# Patient Record
Sex: MALE | Race: WHITE | NOT HISPANIC OR LATINO | Employment: OTHER | URBAN - METROPOLITAN AREA
[De-identification: names, ages, dates, MRNs, and addresses within clinical notes are randomized per-mention and may not be internally consistent; named-entity substitution may affect disease eponyms.]

---

## 2018-05-15 ENCOUNTER — LAB VISIT (OUTPATIENT)
Dept: LAB | Facility: OTHER | Age: 28
End: 2018-05-15
Attending: INTERNAL MEDICINE
Payer: COMMERCIAL

## 2018-05-15 ENCOUNTER — TELEPHONE (OUTPATIENT)
Dept: ADMINISTRATIVE | Facility: OTHER | Age: 28
End: 2018-05-15

## 2018-05-15 ENCOUNTER — OFFICE VISIT (OUTPATIENT)
Dept: INTERNAL MEDICINE | Facility: CLINIC | Age: 28
End: 2018-05-15
Attending: INTERNAL MEDICINE
Payer: COMMERCIAL

## 2018-05-15 VITALS
DIASTOLIC BLOOD PRESSURE: 64 MMHG | SYSTOLIC BLOOD PRESSURE: 110 MMHG | WEIGHT: 170.44 LBS | BODY MASS INDEX: 22.59 KG/M2 | HEIGHT: 73 IN

## 2018-05-15 DIAGNOSIS — Z00.00 ANNUAL PHYSICAL EXAM: Primary | ICD-10-CM

## 2018-05-15 DIAGNOSIS — H53.8 BLURRY VISION: ICD-10-CM

## 2018-05-15 DIAGNOSIS — Z11.3 SCREEN FOR STD (SEXUALLY TRANSMITTED DISEASE): ICD-10-CM

## 2018-05-15 DIAGNOSIS — Z00.00 ANNUAL PHYSICAL EXAM: ICD-10-CM

## 2018-05-15 LAB
ALBUMIN SERPL BCP-MCNC: 4.6 G/DL
ALP SERPL-CCNC: 55 U/L
ALT SERPL W/O P-5'-P-CCNC: 17 U/L
ANION GAP SERPL CALC-SCNC: 12 MMOL/L
AST SERPL-CCNC: 17 U/L
BASOPHILS # BLD AUTO: 0.03 K/UL
BASOPHILS NFR BLD: 0.6 %
BILIRUB SERPL-MCNC: 2.1 MG/DL
BUN SERPL-MCNC: 12 MG/DL
CALCIUM SERPL-MCNC: 9.9 MG/DL
CHLORIDE SERPL-SCNC: 104 MMOL/L
CHOLEST SERPL-MCNC: 177 MG/DL
CHOLEST/HDLC SERPL: 3.8 {RATIO}
CO2 SERPL-SCNC: 26 MMOL/L
CREAT SERPL-MCNC: 0.9 MG/DL
DIFFERENTIAL METHOD: ABNORMAL
EOSINOPHIL # BLD AUTO: 0.1 K/UL
EOSINOPHIL NFR BLD: 2.1 %
ERYTHROCYTE [DISTWIDTH] IN BLOOD BY AUTOMATED COUNT: 12.8 %
EST. GFR  (AFRICAN AMERICAN): >60 ML/MIN/1.73 M^2
EST. GFR  (NON AFRICAN AMERICAN): >60 ML/MIN/1.73 M^2
ESTIMATED AVG GLUCOSE: 94 MG/DL
GLUCOSE SERPL-MCNC: 88 MG/DL
HBA1C MFR BLD HPLC: 4.9 %
HBV SURFACE AG SERPL QL IA: NEGATIVE
HCT VFR BLD AUTO: 45.2 %
HCV AB SERPL QL IA: NEGATIVE
HDLC SERPL-MCNC: 46 MG/DL
HDLC SERPL: 26 %
HGB BLD-MCNC: 15.5 G/DL
HIV 1+2 AB+HIV1 P24 AG SERPL QL IA: NEGATIVE
LDLC SERPL CALC-MCNC: 104.4 MG/DL
LYMPHOCYTES # BLD AUTO: 1.8 K/UL
LYMPHOCYTES NFR BLD: 37.4 %
MCH RBC QN AUTO: 30.4 PG
MCHC RBC AUTO-ENTMCNC: 34.3 G/DL
MCV RBC AUTO: 89 FL
MONOCYTES # BLD AUTO: 0.2 K/UL
MONOCYTES NFR BLD: 4.9 %
NEUTROPHILS # BLD AUTO: 2.6 K/UL
NEUTROPHILS NFR BLD: 55 %
NONHDLC SERPL-MCNC: 131 MG/DL
PLATELET # BLD AUTO: 203 K/UL
PMV BLD AUTO: 11 FL
POTASSIUM SERPL-SCNC: 4.2 MMOL/L
PROT SERPL-MCNC: 7.7 G/DL
RBC # BLD AUTO: 5.1 M/UL
SODIUM SERPL-SCNC: 142 MMOL/L
TRIGL SERPL-MCNC: 133 MG/DL
TSH SERPL DL<=0.005 MIU/L-ACNC: 1.44 UIU/ML
WBC # BLD AUTO: 4.71 K/UL

## 2018-05-15 PROCEDURE — 84443 ASSAY THYROID STIM HORMONE: CPT

## 2018-05-15 PROCEDURE — 99999 PR PBB SHADOW E&M-EST. PATIENT-LVL III: CPT | Mod: PBBFAC,,, | Performed by: INTERNAL MEDICINE

## 2018-05-15 PROCEDURE — 86803 HEPATITIS C AB TEST: CPT

## 2018-05-15 PROCEDURE — 80053 COMPREHEN METABOLIC PANEL: CPT

## 2018-05-15 PROCEDURE — 85025 COMPLETE CBC W/AUTO DIFF WBC: CPT

## 2018-05-15 PROCEDURE — 87340 HEPATITIS B SURFACE AG IA: CPT

## 2018-05-15 PROCEDURE — 87491 CHLMYD TRACH DNA AMP PROBE: CPT

## 2018-05-15 PROCEDURE — 86592 SYPHILIS TEST NON-TREP QUAL: CPT

## 2018-05-15 PROCEDURE — 99395 PREV VISIT EST AGE 18-39: CPT | Mod: S$GLB,,, | Performed by: INTERNAL MEDICINE

## 2018-05-15 PROCEDURE — 80061 LIPID PANEL: CPT

## 2018-05-15 PROCEDURE — 86703 HIV-1/HIV-2 1 RESULT ANTBDY: CPT

## 2018-05-15 PROCEDURE — 83036 HEMOGLOBIN GLYCOSYLATED A1C: CPT

## 2018-05-15 PROCEDURE — 36415 COLL VENOUS BLD VENIPUNCTURE: CPT

## 2018-05-15 NOTE — PROGRESS NOTES
"Subjective:       Patient ID: Haroon Hernadez is a 28 y.o. male.    Chief Complaint: Annual Exam    Here for annual exam    Headaches while traveling recently. Did have one that had significant sensitivity to light. No aura, focal neurological symptoms, N/V, dizziness, or blurry vision. These have resolved.       Hx of hemorrhoids. Improved with high fiber diet.     He reports difficulty with his eyes adjusting from near to far and is worsening. Has seen optometrist but would like another opinion.    He would like STD screening today. He denies any known exposure, penile discharge, dysuria, urinary frequency or urgency, testicular swelling or pain, rash, or abd pain.          Review of Systems   Constitutional: Negative for activity change and unexpected weight change.   HENT: Negative for hearing loss, rhinorrhea and trouble swallowing.    Eyes: Positive for visual disturbance. Negative for discharge.   Respiratory: Negative for chest tightness and wheezing.    Cardiovascular: Negative for chest pain and palpitations.   Gastrointestinal: Negative for blood in stool, constipation, diarrhea and vomiting.   Endocrine: Negative for polydipsia and polyuria.   Genitourinary: Negative for difficulty urinating, hematuria and urgency.   Musculoskeletal: Positive for neck pain. Negative for arthralgias and joint swelling.   Neurological: Positive for headaches. Negative for weakness.   Psychiatric/Behavioral: Negative for confusion and dysphoric mood.       Objective:      Vitals:    05/15/18 0932   BP: 110/64   Weight: 77.3 kg (170 lb 6.7 oz)   Height: 6' 1" (1.854 m)      Physical Exam   Constitutional: He is oriented to person, place, and time. He appears well-developed and well-nourished. No distress.   HENT:   Head: Normocephalic and atraumatic.   Mouth/Throat: Oropharynx is clear and moist. No oropharyngeal exudate.   Eyes: Conjunctivae and EOM are normal. Pupils are equal, round, and reactive to light. No scleral " icterus.   Neck: No thyromegaly present.   Cardiovascular: Normal rate, regular rhythm and normal heart sounds.    No murmur heard.  Pulmonary/Chest: Effort normal and breath sounds normal. He has no wheezes. He has no rales.   Abdominal: Soft. He exhibits no distension. There is no tenderness.   Musculoskeletal: He exhibits no edema or tenderness.   Lymphadenopathy:     He has no cervical adenopathy.   Neurological: He is alert and oriented to person, place, and time.   Skin: Skin is warm and dry.   Psychiatric: He has a normal mood and affect. His behavior is normal.       Assessment:       1. Annual physical exam    2. Blurry vision    3. Screen for STD (sexually transmitted disease)        Plan:       Haroon was seen today for annual exam.    Diagnoses and all orders for this visit:    Annual physical exam  -     Comprehensive metabolic panel; Future  -     Lipid panel; Future  -     TSH; Future  -     CBC auto differential; Future  -     Hemoglobin A1c; Future    Blurry vision  -     Ambulatory Referral to Ophthalmology    Screen for STD (sexually transmitted disease)  -     RPR; Future  -     HIV-1 and HIV-2 antibodies; Future  -     Hepatitis C antibody; Future  -     Hepatitis B surface antigen; Future  -     C. trachomatis/N. gonorrhoeae by AMP DNA Urine       RTC in 1 year or sooner prn       Side effects of medication(s) were discussed in detail and patient voiced understanding.  Patient will call back for any issues or complications.

## 2018-05-16 LAB
C TRACH DNA SPEC QL NAA+PROBE: NOT DETECTED
N GONORRHOEA DNA SPEC QL NAA+PROBE: NOT DETECTED
RPR SER QL: NORMAL

## 2018-07-16 ENCOUNTER — OFFICE VISIT (OUTPATIENT)
Dept: INTERNAL MEDICINE | Facility: CLINIC | Age: 28
End: 2018-07-16
Payer: COMMERCIAL

## 2018-07-16 VITALS
BODY MASS INDEX: 21.85 KG/M2 | HEIGHT: 73 IN | HEART RATE: 79 BPM | WEIGHT: 164.88 LBS | SYSTOLIC BLOOD PRESSURE: 130 MMHG | DIASTOLIC BLOOD PRESSURE: 70 MMHG

## 2018-07-16 DIAGNOSIS — R20.8 BURNING SENSATION OF MOUTH: Primary | ICD-10-CM

## 2018-07-16 PROCEDURE — 99999 PR PBB SHADOW E&M-EST. PATIENT-LVL III: CPT | Mod: PBBFAC,,, | Performed by: INTERNAL MEDICINE

## 2018-07-16 PROCEDURE — 99213 OFFICE O/P EST LOW 20 MIN: CPT | Mod: S$GLB,,, | Performed by: INTERNAL MEDICINE

## 2018-07-16 PROCEDURE — 3008F BODY MASS INDEX DOCD: CPT | Mod: CPTII,S$GLB,, | Performed by: INTERNAL MEDICINE

## 2018-07-16 RX ORDER — NYSTATIN 100000 [USP'U]/ML
4 SUSPENSION ORAL 3 TIMES DAILY
Qty: 120 ML | Refills: 0 | Status: SHIPPED | OUTPATIENT
Start: 2018-07-16 | End: 2018-07-26

## 2018-07-16 NOTE — PROGRESS NOTES
Subjective:       Patient ID: Haroon Hernadez is a 28 y.o. male.    Chief Complaint: Oral Swelling (tongue burning) and Numbness (tongue)    Pt c/o tingling in tip of tongue that started a month ago after a presumably viral URI. Did not take abx. No pain or swelling of tongue. It did improve initially but now is constant. It extends from tip of tongue now to back of tongue but not always. It can cause occasional difficult swallowing but does not have any issues eating. In fact eating helps. No sob.       Review of Systems   Constitutional: Negative for activity change and unexpected weight change.   HENT: Positive for trouble swallowing. Negative for hearing loss and rhinorrhea.    Eyes: Negative for discharge and visual disturbance.   Respiratory: Negative for chest tightness, shortness of breath and wheezing.    Cardiovascular: Negative for chest pain and palpitations.   Gastrointestinal: Negative for blood in stool, constipation, diarrhea and vomiting.   Endocrine: Negative for polydipsia and polyuria.   Genitourinary: Negative for difficulty urinating, hematuria and urgency.   Musculoskeletal: Negative for arthralgias, joint swelling and neck pain.   Neurological: Negative for weakness and headaches.   Psychiatric/Behavioral: Negative for confusion and dysphoric mood.       Objective:      Physical Exam   Constitutional: He is oriented to person, place, and time. He appears well-developed and well-nourished.   HENT:   Right Ear: Tympanic membrane, external ear and ear canal normal.   Left Ear: Tympanic membrane, external ear and ear canal normal.   Nose: No mucosal edema or rhinorrhea.   Mouth/Throat: No oropharyngeal exudate or posterior oropharyngeal erythema.   Neck: Neck supple. No thyromegaly present.   Cardiovascular: Normal rate, regular rhythm and normal heart sounds.    Pulmonary/Chest: Effort normal and breath sounds normal.   Lymphadenopathy:     He has no cervical adenopathy.   Neurological: He  is alert and oriented to person, place, and time.   Psychiatric: He has a normal mood and affect. His behavior is normal.       Assessment:       1. Burning sensation of mouth        Plan:       1. No clear etiology but is worth trying short course of nystatin swish/swallow x7days  2. He will see his dentist to ensure no issues there as well  3. If above ineffective, then ENT referral

## 2018-07-18 ENCOUNTER — PATIENT MESSAGE (OUTPATIENT)
Dept: INTERNAL MEDICINE | Facility: CLINIC | Age: 28
End: 2018-07-18

## 2018-08-09 ENCOUNTER — PATIENT MESSAGE (OUTPATIENT)
Dept: INTERNAL MEDICINE | Facility: CLINIC | Age: 28
End: 2018-08-09

## 2018-08-09 ENCOUNTER — TELEPHONE (OUTPATIENT)
Dept: INTERNAL MEDICINE | Facility: CLINIC | Age: 28
End: 2018-08-09

## 2018-08-09 DIAGNOSIS — K14.6 TONGUE BURNING SENSATION: Primary | ICD-10-CM

## 2018-08-09 NOTE — TELEPHONE ENCOUNTER
Call place to pt regarding tongue discomfort and cramping pain to right upper belly area. Left message to call office .Please advise / authorize.  Lov: 07/16/2016

## 2018-08-09 NOTE — TELEPHONE ENCOUNTER
----- Message from Cassidy Beyer sent at 8/9/2018  1:32 PM CDT -----  Contact: Haroon  Name of Who is Calling: Haroon    What is the request in detail: Patient was returning a missed call back from the staff       Can the clinic reply by [facility]: yes      [facility] [phone]

## 2018-08-23 ENCOUNTER — PATIENT MESSAGE (OUTPATIENT)
Dept: INTERNAL MEDICINE | Facility: CLINIC | Age: 28
End: 2018-08-23

## 2018-08-23 DIAGNOSIS — K14.6 TONGUE BURNING SENSATION: Primary | ICD-10-CM

## 2018-08-24 ENCOUNTER — PATIENT MESSAGE (OUTPATIENT)
Dept: INTERNAL MEDICINE | Facility: CLINIC | Age: 28
End: 2018-08-24

## 2018-10-04 ENCOUNTER — OFFICE VISIT (OUTPATIENT)
Dept: OTOLARYNGOLOGY | Facility: CLINIC | Age: 28
End: 2018-10-04
Payer: COMMERCIAL

## 2018-10-04 VITALS
SYSTOLIC BLOOD PRESSURE: 135 MMHG | DIASTOLIC BLOOD PRESSURE: 78 MMHG | BODY MASS INDEX: 22.05 KG/M2 | HEIGHT: 73 IN | HEART RATE: 87 BPM | TEMPERATURE: 98 F | WEIGHT: 166.38 LBS

## 2018-10-04 DIAGNOSIS — R20.0 NUMBNESS OF TONGUE: ICD-10-CM

## 2018-10-04 DIAGNOSIS — K14.6 BURNING TONGUE: ICD-10-CM

## 2018-10-04 DIAGNOSIS — Z87.19 HISTORY OF ORAL APHTHOUS ULCERS: ICD-10-CM

## 2018-10-04 DIAGNOSIS — Z87.09 HISTORY OF URI (UPPER RESPIRATORY INFECTION): ICD-10-CM

## 2018-10-04 PROCEDURE — 99204 OFFICE O/P NEW MOD 45 MIN: CPT | Mod: S$GLB,,, | Performed by: OTOLARYNGOLOGY

## 2018-10-04 PROCEDURE — 3008F BODY MASS INDEX DOCD: CPT | Mod: CPTII,S$GLB,, | Performed by: OTOLARYNGOLOGY

## 2018-10-04 RX ORDER — ASPIRIN 325 MG
325 TABLET ORAL DAILY PRN
COMMUNITY
End: 2019-05-15

## 2018-10-04 RX ORDER — CLOTRIMAZOLE 10 MG/1
10 LOZENGE ORAL; TOPICAL
Qty: 50 TROCHE | Refills: 0 | Status: SHIPPED | OUTPATIENT
Start: 2018-10-04 | End: 2019-05-15

## 2018-10-04 NOTE — PATIENT INSTRUCTIONS
Take generic Mycelex lozenges as prescribed.    Hydration and avoid oral irritants, etc.    Follow up in 2-3 weeks.

## 2018-10-04 NOTE — LETTER
October 23, 2018      Prakash Trammell MD  [address]  [facility] [address]           [facility]  [address] [facility] [address]  [facility] [address]  Phone: [phone]  Fax: [phone]          Patient: Haroon Hernadez   MR Number: [id number]   YOB: 1990   Date of Visit: 10/4/2018       Dear Dr. Prakash Trammell:    Thank you for referring Haroon Hernadez to me for evaluation. Attached you will find relevant portions of my assessment and plan of care.    If you have questions, please do not hesitate to call me. I look forward to following Haroon Hernadez along with you.    Sincerely,    Cherry Zavala MD    Enclosure  CC:  No Recipients    If you would like to receive this communication electronically, please contact [email or web address] or [phone] to request more information on [employer] access.    For providers and/or their staff who would like to refer a patient to [facility], please contact us through our one-stop-shop provider referral line, [facility], at [phone].    If you feel you have received this communication in error or would no longer like to receive these types of communications, please e-mail [email or web address]

## 2018-10-25 NOTE — PROGRESS NOTES
Subjective:       Patient ID: Haroon Hernadez is a 28 y.o. male.    Chief Complaint: Other (tongue sensations/burning/numbness not daily  several months)    He is a new patient for me here today complaining of problems with his tongue since June of this year when he contracted a viral respiratory infection associated with postnasal drip and cough.  He states the respiratory symptoms cleared after week or so, but the cough lingered for about 1 month.  During all of this he began to notice weird sensations of his tongue primarily consisting of pain and a burning sensation as if scalding his tongue but also at times numbness.  He reports this is over all improved but continues to bother him regularly.  He states symptoms are more noticeable in the morning and improve with chewing or eating.  He denies mouth breathing or nasal congestion or any other nasal symptoms.  He denies dry mouth or tongue brushing.  He denies reflux symptoms, later states seldom has GERD.  He saw his dentist recently and told everything looked fine from a dental standpoint.  There is a tendency for recurring oral a mouth ulcers since childhood.  He denies recent diet change or weight loss and feels fine otherwise.  No dermatologic problems.  He recently tried nystatin with some benefit.  He has also been taking Claritin and fluticasone nasal spray as well as chewing mint gum regularly.            Review of Systems   Ears: Positive for ear pain.  Negative for hearing loss, ear pressure, ringing in ear, ear discharge, ear infections, head trauma, taken gentramycin/streptomycin and family history of hearing loss.    Nose:  Negative for nosebleeds, nasal obstruction, nasal or sinus surgery, loss of smell and snoring.    Mouth/Throat: Positive for oral ulcers. Negative for pain swallowing, impaired swallowing, hoarse voice, throat mass, neck mass and neck lumps.   Constitutional: Negative for recent unexplained weight loss, fever, chills and  night sweats.    Eyes:  Negative for history of glaucoma and visual change.   Cardiovascular:  Negative for chest pain, palpitations and history of high blood pressure.   Respiratory:  Negative for asthma, emphysema, history of tuberculosis, recent cough and shortness of breath.    Gastrointestinal:  Negative for history of stomach ulcers or pain, indigestion and blood in stool.   Other:  Negative for kidney problem, bladder problem, prostate disease, new or changing moles, weakness, disturbances in coordination, slurred, swollen glands and anemia.           Objective:        Vitals:    10/04/18 1035   BP: 135/78   Pulse: 87   Temp: 97.6 °F (36.4 °C)     Body mass index is 21.95 kg/m².  Physical Exam   Constitutional: He is oriented to person, place, and time. He appears well-developed and well-nourished. No distress.   HENT:   Head: Normocephalic and atraumatic.   Right Ear: Tympanic membrane, external ear and ear canal normal.   Left Ear: Tympanic membrane, external ear and ear canal normal.   Nose: No mucosal edema, rhinorrhea or nasal deformity. No epistaxis.   Mouth/Throat: Uvula is midline and oropharynx is clear and moist. No oral lesions. No trismus in the jaw. No uvula swelling. No oropharyngeal exudate, posterior oropharyngeal edema or posterior oropharyngeal erythema.   Tongue and oral mucosa are pink with no ulcers or lesions seen at this time.   Neck: Normal range of motion and phonation normal. Neck supple. No tracheal deviation present. No thyromegaly present.   Pulmonary/Chest: Effort normal. No respiratory distress.   Lymphadenopathy:     He has no cervical adenopathy.   Neurological: He is alert and oriented to person, place, and time. He displays no weakness. No cranial nerve deficit.   Skin: Skin is warm and dry.   Psychiatric: He has a normal mood and affect. His behavior is normal. His speech is not slurred.       Tests / Results:        Assessment:       1. Burning tongue    2. Numbness of  tongue    3. History of URI (upper respiratory infection)    4. History of oral aphthous ulcers        Plan:       Reviewed all above and considerations and recommendations and answered questions.    Will take generic Mycelex lozenges as prescribed.    Hydration and avoid oral irritants as discussed.    Follow up in 2-3 weeks.

## 2018-10-31 ENCOUNTER — OFFICE VISIT (OUTPATIENT)
Dept: OTOLARYNGOLOGY | Facility: CLINIC | Age: 28
End: 2018-10-31
Payer: COMMERCIAL

## 2018-10-31 VITALS
BODY MASS INDEX: 22.29 KG/M2 | DIASTOLIC BLOOD PRESSURE: 71 MMHG | TEMPERATURE: 97 F | HEART RATE: 75 BPM | WEIGHT: 168.19 LBS | HEIGHT: 73 IN | SYSTOLIC BLOOD PRESSURE: 115 MMHG

## 2018-10-31 DIAGNOSIS — K14.6 BURNING TONGUE: ICD-10-CM

## 2018-10-31 DIAGNOSIS — Z87.19 HISTORY OF ORAL APHTHOUS ULCERS: ICD-10-CM

## 2018-10-31 PROCEDURE — 99214 OFFICE O/P EST MOD 30 MIN: CPT | Mod: S$GLB,,, | Performed by: OTOLARYNGOLOGY

## 2018-10-31 PROCEDURE — 3008F BODY MASS INDEX DOCD: CPT | Mod: CPTII,S$GLB,, | Performed by: OTOLARYNGOLOGY

## 2018-10-31 NOTE — PROGRESS NOTES
"Subjective:       Patient ID: Haroon Hernadez is a 28 y.o. male.    Chief Complaint: Follow-up    At his last visit on 10/04/2018 he was a new patient for me with history as follows:   He is a new patient for me here today complaining of problems with his tongue since June of this year when he contracted a viral respiratory infection associated with postnasal drip and cough.  He states the respiratory symptoms cleared after week or so, but the cough lingered for about 1 month.  During all of this he began to notice "weird sensations" of his tongue primarily consisting of pain and a burning sensation "as if scalding" his tongue but also at times numbness.  He reports this is over all improved but continues to bother him regularly.  He states symptoms are more noticeable in the morning and improve with chewing or eating.  He denies mouth breathing or nasal congestion or any other nasal symptoms.  He denies dry mouth or tongue brushing.  He denies reflux symptoms, later states seldom has GERD.  He saw his dentist recently and told everything looked fine from a dental standpoint.  There is a tendency for recurring oral a mouth ulcers since childhood.  He denies recent diet change or weight loss and feels fine otherwise.  No dermatologic problems.  He recently tried nystatin with some benefit.  He has also been taking Claritin and fluticasone nasal spray as well as chewing mint gum regularly.     After the last visit he took the Mycelex lozenges as prescribed as well as the other recommendations of hydration and avoiding oral irritants as discussed.  He states he is not sure if any this made any difference however he has gradually improved over time. He states it is not a terrible burning sensation but more of feeling like sandpaper.  He notices it more on the tip of his tongue.  He denies any nasal congestion or mouth breathing or dry eyes or other dermatologic complaints.  No clenching or biting the tongue noted.  " No significant diet change.  Denies reflux symptoms.  He feels fine otherwise.             Review of Systems   Ears: Negative for hearing loss, ear pressure, ringing in ear, ear discharge, ear infections, head trauma, taken gentramycin/streptomycin and family history of hearing loss.    Nose:  Negative for nosebleeds, nasal obstruction, nasal or sinus surgery, loss of smell and snoring.    Mouth/Throat: Positive for oral ulcers. Negative for pain swallowing, impaired swallowing, hoarse voice, throat mass, neck mass and neck lumps.   Constitutional: Negative for recent unexplained weight loss, fever, chills and night sweats.    Eyes:  Negative for history of glaucoma and visual change.   Cardiovascular:  Negative for chest pain, palpitations and history of high blood pressure.   Respiratory:  Negative for asthma, emphysema, history of tuberculosis, recent cough and shortness of breath.    Gastrointestinal:  Negative for history of stomach ulcers or pain, indigestion and blood in stool.   Other:  Negative for kidney problem, bladder problem, prostate disease, new or changing moles, weakness, disturbances in coordination, slurred, swollen glands and anemia.           Objective:        Vitals:    10/31/18 1508   BP: 115/71   Pulse: 75   Temp: 97.4 °F (36.3 °C)     Body mass index is 22.19 kg/m².  Physical Exam   Constitutional: He is oriented to person, place, and time. He appears well-developed and well-nourished. No distress.   HENT:   Head: Normocephalic and atraumatic.   Right Ear: Tympanic membrane, external ear and ear canal normal.   Left Ear: Tympanic membrane, external ear and ear canal normal.   Nose: No mucosal edema, rhinorrhea or nasal deformity. No epistaxis.   Mouth/Throat: Uvula is midline and oropharynx is clear and moist. No oral lesions. No trismus in the jaw. No uvula swelling. No oropharyngeal exudate, posterior oropharyngeal edema or posterior oropharyngeal erythema.   Neck: Normal range of motion  and phonation normal. Neck supple. No tracheal deviation present. No thyromegaly present.   Pulmonary/Chest: Effort normal. No respiratory distress.   Lymphadenopathy:     He has no cervical adenopathy.   Neurological: He is alert and oriented to person, place, and time. He displays no weakness.   Skin: Skin is warm and dry.   Psychiatric: He has a normal mood and affect. His behavior is normal. His speech is not slurred.       Tests / Results:        Assessment:       1. Burning tongue    2. History of oral aphthous ulcers        Plan:       Options reviewed and will take compounded oral rinse of equal parts Benadryl, Maalox, Celestone as prescribed 3- 4 times a day.  Continue to avoid oral irritants, hydrate, etc.  Follow up in 3 weeks.

## 2019-02-21 ENCOUNTER — TELEPHONE (OUTPATIENT)
Dept: OPTOMETRY | Facility: CLINIC | Age: 29
End: 2019-02-21

## 2019-02-21 NOTE — TELEPHONE ENCOUNTER
02/21/19  Mesha returned pt call and I have scheduled NP exam with Dr. Neves, RISHI/kristina 12:15p      ----- Message from Emelia Ni sent at 2/21/2019 11:49 AM CST -----  Contact: Self  Pt is calling to be scheduled for an appt from the referral in Epic; however, the referral was initiated in 2018.  Pt requests Staff contact him for scheduling.    He can be reached at [phone].    Thank you.

## 2019-03-26 ENCOUNTER — OFFICE VISIT (OUTPATIENT)
Dept: OPTOMETRY | Facility: CLINIC | Age: 29
End: 2019-03-26
Payer: COMMERCIAL

## 2019-03-26 DIAGNOSIS — H52.203 MYOPIA WITH ASTIGMATISM, BILATERAL: Primary | ICD-10-CM

## 2019-03-26 DIAGNOSIS — H52.203 MYOPIA WITH ASTIGMATISM, BILATERAL: ICD-10-CM

## 2019-03-26 DIAGNOSIS — H40.013 OAG (OPEN ANGLE GLAUCOMA) SUSPECT, LOW RISK, BILATERAL: Primary | ICD-10-CM

## 2019-03-26 DIAGNOSIS — H52.13 MYOPIA WITH ASTIGMATISM, BILATERAL: ICD-10-CM

## 2019-03-26 DIAGNOSIS — Z46.0 FITTING AND ADJUSTMENT OF SPECTACLES AND CONTACT LENSES: ICD-10-CM

## 2019-03-26 DIAGNOSIS — H52.13 MYOPIA WITH ASTIGMATISM, BILATERAL: Primary | ICD-10-CM

## 2019-03-26 PROCEDURE — 92310 CONTACT LENS FITTING OU: CPT | Mod: ,,, | Performed by: OPTOMETRIST

## 2019-03-26 PROCEDURE — 92310 PR CONTACT LENS FITTING (NO CHANGE): ICD-10-PCS | Mod: ,,, | Performed by: OPTOMETRIST

## 2019-03-26 PROCEDURE — 99999 PR PBB SHADOW E&M-EST. PATIENT-LVL II: ICD-10-PCS | Mod: PBBFAC,,, | Performed by: OPTOMETRIST

## 2019-03-26 PROCEDURE — 92133 POSTERIOR SEGMENT OCT OPTIC NERVE(OCULAR COHERENCE TOMOGRAPHY) - OU - BOTH EYES: ICD-10-PCS | Mod: S$GLB,,, | Performed by: OPTOMETRIST

## 2019-03-26 PROCEDURE — 92004 PR EYE EXAM, NEW PATIENT,COMPREHESV: ICD-10-PCS | Mod: S$GLB,,, | Performed by: OPTOMETRIST

## 2019-03-26 PROCEDURE — 92133 CPTRZD OPH DX IMG PST SGM ON: CPT | Mod: S$GLB,,, | Performed by: OPTOMETRIST

## 2019-03-26 PROCEDURE — 92004 COMPRE OPH EXAM NEW PT 1/>: CPT | Mod: S$GLB,,, | Performed by: OPTOMETRIST

## 2019-03-26 PROCEDURE — 99999 PR PBB SHADOW E&M-EST. PATIENT-LVL II: CPT | Mod: PBBFAC,,, | Performed by: OPTOMETRIST

## 2019-03-26 NOTE — PROGRESS NOTES
HPI     New pt      Pt is here for routine eye exam. Pt has noticed slightly decrease vision.   Pt wants to get fitted for contact lenses. His has worn them for over a   year. Pt was wearing Dailies SCL's.   Pt denies f/f, pt denies headaches, pt denies irritation, tearing,   redness, etc.  JUSTIN: two years ago.  No eye gtts.        Last edited by Mehrdad Kimball MA on 3/26/2019  1:27 PM. (History)        ROS     Negative for: Constitutional, Gastrointestinal, Neurological, Skin,   Genitourinary, Musculoskeletal, HENT, Endocrine, Cardiovascular, Eyes,   Respiratory, Psychiatric, Allergic/Imm, Heme/Lymph    Last edited by Latoya Neves, RISHI on 3/26/2019  2:02 PM. (History)        Assessment /Plan     For exam results, see Encounter Report.    OAG (open angle glaucoma) suspect, low risk, bilateral  -     Posterior Segment OCT Optic Nerve- Both eyes    Myopia with astigmatism, bilateral    1. (-) FHx. IOP 19 OD, OS. C/d0 0.55 OD 0.6 OS. Normal OCT today OU. Retest in 1-2 years. Educated pt on findings.   2. SRx updated. NOrmal ocular health. Order CL. RTC CLFU at dispense.

## 2019-04-02 ENCOUNTER — TELEPHONE (OUTPATIENT)
Dept: OPHTHALMOLOGY | Facility: CLINIC | Age: 29
End: 2019-04-02

## 2019-04-02 NOTE — TELEPHONE ENCOUNTER
Tried to call pt to schedule clfu appointment because his lenses are in.  No answer, LM for pt to call back to schedule.

## 2019-04-11 ENCOUNTER — OFFICE VISIT (OUTPATIENT)
Dept: OPTOMETRY | Facility: CLINIC | Age: 29
End: 2019-04-11

## 2019-04-11 DIAGNOSIS — H52.13 MYOPIA WITH ASTIGMATISM, BILATERAL: Primary | ICD-10-CM

## 2019-04-11 DIAGNOSIS — H52.203 MYOPIA WITH ASTIGMATISM, BILATERAL: Primary | ICD-10-CM

## 2019-04-11 PROCEDURE — 92499 UNLISTED OPH SVC/PROCEDURE: CPT | Mod: ,,, | Performed by: OPTOMETRIST

## 2019-04-11 PROCEDURE — 92499 PR CONTACT LENS F/U LEV 1: ICD-10-PCS | Mod: ,,, | Performed by: OPTOMETRIST

## 2019-04-12 NOTE — PROGRESS NOTES
HPI     Pt picking up CL. Pt notes difference in thickness of lenses but Rx is   about the same.     Last edited by Latoya Neves, OD on 4/12/2019  8:48 AM. (History)        ROS     Negative for: Constitutional, Gastrointestinal, Neurological, Skin,   Genitourinary, Musculoskeletal, HENT, Endocrine, Cardiovascular, Eyes,   Respiratory, Psychiatric, Allergic/Imm, Heme/Lymph    Last edited by Latoya Neves, OD on 4/11/2019  4:24 PM. (History)        Assessment /Plan     For exam results, see Encounter Report.    Myopia with astigmatism, bilateral      CLRx has good fit and vision. Rx released. Will call to check on the pt in 1 week.   RTC 1 year.

## 2019-04-15 ENCOUNTER — TELEPHONE (OUTPATIENT)
Dept: OPTOMETRY | Facility: CLINIC | Age: 29
End: 2019-04-15

## 2019-05-15 ENCOUNTER — OFFICE VISIT (OUTPATIENT)
Dept: INTERNAL MEDICINE | Facility: CLINIC | Age: 29
End: 2019-05-15
Payer: COMMERCIAL

## 2019-05-15 ENCOUNTER — LAB VISIT (OUTPATIENT)
Dept: LAB | Facility: OTHER | Age: 29
End: 2019-05-15
Attending: INTERNAL MEDICINE
Payer: COMMERCIAL

## 2019-05-15 ENCOUNTER — TELEPHONE (OUTPATIENT)
Dept: DERMATOLOGY | Facility: CLINIC | Age: 29
End: 2019-05-15

## 2019-05-15 VITALS
BODY MASS INDEX: 22.21 KG/M2 | WEIGHT: 167.56 LBS | DIASTOLIC BLOOD PRESSURE: 74 MMHG | SYSTOLIC BLOOD PRESSURE: 120 MMHG | HEART RATE: 86 BPM | HEIGHT: 73 IN

## 2019-05-15 DIAGNOSIS — Z00.00 WELLNESS EXAMINATION: ICD-10-CM

## 2019-05-15 DIAGNOSIS — Z00.00 WELLNESS EXAMINATION: Primary | ICD-10-CM

## 2019-05-15 DIAGNOSIS — R21 RASH: ICD-10-CM

## 2019-05-15 LAB
ALBUMIN SERPL BCP-MCNC: 4.8 G/DL (ref 3.5–5.2)
ALP SERPL-CCNC: 63 U/L (ref 55–135)
ALT SERPL W/O P-5'-P-CCNC: 22 U/L (ref 10–44)
ANION GAP SERPL CALC-SCNC: 10 MMOL/L (ref 8–16)
AST SERPL-CCNC: 19 U/L (ref 10–40)
BASOPHILS # BLD AUTO: 0.02 K/UL (ref 0–0.2)
BASOPHILS NFR BLD: 0.4 % (ref 0–1.9)
BILIRUB SERPL-MCNC: 1.9 MG/DL (ref 0.1–1)
BUN SERPL-MCNC: 20 MG/DL (ref 6–20)
CALCIUM SERPL-MCNC: 9.8 MG/DL (ref 8.7–10.5)
CHLORIDE SERPL-SCNC: 105 MMOL/L (ref 95–110)
CO2 SERPL-SCNC: 26 MMOL/L (ref 23–29)
CREAT SERPL-MCNC: 0.9 MG/DL (ref 0.5–1.4)
DIFFERENTIAL METHOD: ABNORMAL
EOSINOPHIL # BLD AUTO: 0.1 K/UL (ref 0–0.5)
EOSINOPHIL NFR BLD: 1.6 % (ref 0–8)
ERYTHROCYTE [DISTWIDTH] IN BLOOD BY AUTOMATED COUNT: 13.2 % (ref 11.5–14.5)
EST. GFR  (AFRICAN AMERICAN): >60 ML/MIN/1.73 M^2
EST. GFR  (NON AFRICAN AMERICAN): >60 ML/MIN/1.73 M^2
GLUCOSE SERPL-MCNC: 84 MG/DL (ref 70–110)
HCT VFR BLD AUTO: 44 % (ref 40–54)
HGB BLD-MCNC: 15.2 G/DL (ref 14–18)
LYMPHOCYTES # BLD AUTO: 3.4 K/UL (ref 1–4.8)
LYMPHOCYTES NFR BLD: 59.9 % (ref 18–48)
MCH RBC QN AUTO: 29.1 PG (ref 27–31)
MCHC RBC AUTO-ENTMCNC: 34.5 G/DL (ref 32–36)
MCV RBC AUTO: 84 FL (ref 82–98)
MONOCYTES # BLD AUTO: 0.3 K/UL (ref 0.3–1)
MONOCYTES NFR BLD: 5.2 % (ref 4–15)
NEUTROPHILS # BLD AUTO: 1.8 K/UL (ref 1.8–7.7)
NEUTROPHILS NFR BLD: 32.7 % (ref 38–73)
PLATELET # BLD AUTO: 199 K/UL (ref 150–350)
PMV BLD AUTO: 10.1 FL (ref 9.2–12.9)
POTASSIUM SERPL-SCNC: 4.1 MMOL/L (ref 3.5–5.1)
PROT SERPL-MCNC: 7.8 G/DL (ref 6–8.4)
RBC # BLD AUTO: 5.22 M/UL (ref 4.6–6.2)
SODIUM SERPL-SCNC: 141 MMOL/L (ref 136–145)
TSH SERPL DL<=0.005 MIU/L-ACNC: 1.37 UIU/ML (ref 0.4–4)
WBC # BLD AUTO: 5.59 K/UL (ref 3.9–12.7)

## 2019-05-15 PROCEDURE — 99395 PR PREVENTIVE VISIT,EST,18-39: ICD-10-PCS | Mod: S$GLB,,, | Performed by: INTERNAL MEDICINE

## 2019-05-15 PROCEDURE — 84443 ASSAY THYROID STIM HORMONE: CPT

## 2019-05-15 PROCEDURE — 99395 PREV VISIT EST AGE 18-39: CPT | Mod: S$GLB,,, | Performed by: INTERNAL MEDICINE

## 2019-05-15 PROCEDURE — 99999 PR PBB SHADOW E&M-EST. PATIENT-LVL III: CPT | Mod: PBBFAC,,, | Performed by: INTERNAL MEDICINE

## 2019-05-15 PROCEDURE — 99999 PR PBB SHADOW E&M-EST. PATIENT-LVL III: ICD-10-PCS | Mod: PBBFAC,,, | Performed by: INTERNAL MEDICINE

## 2019-05-15 PROCEDURE — 36415 COLL VENOUS BLD VENIPUNCTURE: CPT

## 2019-05-15 PROCEDURE — 85025 COMPLETE CBC W/AUTO DIFF WBC: CPT

## 2019-05-15 PROCEDURE — 80053 COMPREHEN METABOLIC PANEL: CPT

## 2019-05-15 NOTE — PROGRESS NOTES
Subjective:       Patient ID: Haroon Hernadez is a 29 y.o. male.    Chief Complaint: Urticaria (x 4 weeks) and Annual Exam    Pt here for annual exam. Counseled on exercise goals.     He also reports rash that come and go on arms. Started 4 weeks ago. These are pruritic. No wheezing/stridor/sob. He has also noticed similar areas in groin/buttocks. They usually are made worse by scratching but overall rash lasts less than an hour. Has used otc HC cream with some relief. No known new hygiene products. He used neosporin once on an area and made area worse.      Review of Systems   Constitutional: Negative for fatigue, fever and unexpected weight change.   HENT: Negative for congestion, rhinorrhea and sore throat.    Eyes: Negative for visual disturbance.   Respiratory: Negative for cough and shortness of breath.    Cardiovascular: Negative for chest pain, palpitations and leg swelling.   Gastrointestinal: Negative for abdominal pain, blood in stool, constipation and diarrhea.   Genitourinary: Negative for difficulty urinating and dysuria.   Skin: Positive for rash. Negative for color change.   Neurological: Negative for dizziness and syncope.   Hematological: Negative for adenopathy.   Psychiatric/Behavioral: Negative for dysphoric mood.       Objective:      Physical Exam   Constitutional: He is oriented to person, place, and time. He appears well-developed and well-nourished.   HENT:   Head: Normocephalic and atraumatic.   Right Ear: External ear normal.   Left Ear: External ear normal.   Mouth/Throat: Oropharynx is clear and moist. No oropharyngeal exudate.   Eyes: Pupils are equal, round, and reactive to light. Conjunctivae and EOM are normal.   Neck: Neck supple. Carotid bruit is not present. No thyromegaly present.   Cardiovascular: Normal rate, regular rhythm, S1 normal, S2 normal, normal heart sounds and intact distal pulses.   Pulmonary/Chest: Effort normal and breath sounds normal.   Abdominal: Soft.  Bowel sounds are normal. He exhibits no mass. There is no hepatosplenomegaly. There is no tenderness.   Musculoskeletal: He exhibits no edema.   Lymphadenopathy:     He has no cervical adenopathy.   Neurological: He is alert and oriented to person, place, and time. No cranial nerve deficit.   Skin:   No rash present today but pics on phone show small patch of papular rash on R forearm   Psychiatric: He has a normal mood and affect. His behavior is normal.       Assessment:       1. Wellness examination    2. Rash        Plan:       1. Appropriate labs  2. Derm referral  3. Trial of zyrtec or claritin  4. Ok to use otc HC cream prn

## 2019-05-15 NOTE — TELEPHONE ENCOUNTER
Asked pt if he's coming in for hives, because if he is he will have to schedule with allergy. Pt stated he's coming in for a rash and has bumps. Pt stated his doctor said he doesn't have hives.      RD

## 2019-05-17 ENCOUNTER — PATIENT MESSAGE (OUTPATIENT)
Dept: INTERNAL MEDICINE | Facility: CLINIC | Age: 29
End: 2019-05-17

## 2019-05-17 DIAGNOSIS — R21 RASH: Primary | ICD-10-CM

## 2019-05-19 ENCOUNTER — PATIENT MESSAGE (OUTPATIENT)
Dept: INTERNAL MEDICINE | Facility: CLINIC | Age: 29
End: 2019-05-19

## 2019-05-21 ENCOUNTER — PATIENT MESSAGE (OUTPATIENT)
Dept: INTERNAL MEDICINE | Facility: CLINIC | Age: 29
End: 2019-05-21

## 2019-05-28 ENCOUNTER — HOSPITAL ENCOUNTER (OUTPATIENT)
Dept: RADIOLOGY | Facility: HOSPITAL | Age: 29
Discharge: HOME | End: 2019-05-28
Attending: ALLERGY & IMMUNOLOGY
Payer: COMMERCIAL

## 2019-05-28 ENCOUNTER — OFFICE VISIT (OUTPATIENT)
Dept: ALLERGY | Facility: CLINIC | Age: 29
End: 2019-05-28
Attending: ALLERGY & IMMUNOLOGY
Payer: COMMERCIAL

## 2019-05-28 VITALS
DIASTOLIC BLOOD PRESSURE: 72 MMHG | WEIGHT: 173.94 LBS | HEIGHT: 73 IN | BODY MASS INDEX: 23.05 KG/M2 | SYSTOLIC BLOOD PRESSURE: 112 MMHG

## 2019-05-28 DIAGNOSIS — L50.1 IDIOPATHIC URTICARIA: ICD-10-CM

## 2019-05-28 DIAGNOSIS — E80.4 GILBERT DISEASE: ICD-10-CM

## 2019-05-28 DIAGNOSIS — E80.4 GILBERT SYNDROME: ICD-10-CM

## 2019-05-28 DIAGNOSIS — L50.1 IDIOPATHIC URTICARIA: Primary | ICD-10-CM

## 2019-05-28 PROCEDURE — 99244 PR OFFICE CONSULTATION,LEVEL IV: ICD-10-PCS | Mod: S$GLB,,, | Performed by: ALLERGY & IMMUNOLOGY

## 2019-05-28 PROCEDURE — 99999 PR PBB SHADOW E&M-EST. PATIENT-LVL III: ICD-10-PCS | Mod: PBBFAC,,, | Performed by: ALLERGY & IMMUNOLOGY

## 2019-05-28 PROCEDURE — 71046 XR CHEST PA AND LATERAL: ICD-10-PCS | Mod: 26,,, | Performed by: RADIOLOGY

## 2019-05-28 PROCEDURE — 99999 PR PBB SHADOW E&M-EST. PATIENT-LVL III: CPT | Mod: PBBFAC,,, | Performed by: ALLERGY & IMMUNOLOGY

## 2019-05-28 PROCEDURE — 99244 OFF/OP CNSLTJ NEW/EST MOD 40: CPT | Mod: S$GLB,,, | Performed by: ALLERGY & IMMUNOLOGY

## 2019-05-28 PROCEDURE — 71046 X-RAY EXAM CHEST 2 VIEWS: CPT | Mod: 26,,, | Performed by: RADIOLOGY

## 2019-05-28 PROCEDURE — 71046 X-RAY EXAM CHEST 2 VIEWS: CPT | Mod: TC

## 2019-05-28 NOTE — PROGRESS NOTES
Alcon Hernadez is referred by Dr. Chidi Joseph for a consult regarding urticaria and angioedema.  He is here alone.    Ten years ago he was in [facility] and developed urticaria of his arms.  He took Benadryl and it resolved.  A few years later [place].  He again took Benadryl with resolution.    This past April he was any of 69 and developed small urticarial lesions that were red, raised, and pruritic.  He had been using a different detergent and attributed it to this.    Since then he has had increased lesions that are now occurring almost daily.  He has been told about dermatographia and has read about that.  His lesions are definitely worse after he scratches.  They do not hurt or bruise.  They usually last 30 to 60 minutes before resolving.    There is no association with any food, contact, or ingestion.  He has not had any thyroid disease.    Several years ago he had an upper respiratory infection and took a Claritin.  He was on his way to [place].  He developed burning of his tongue that lasted several weeks.  He was seen by an ENT and treated for thrush.  It did not resolve.  He continues to have some mild paresthesias of his tongue.  He has not taking any Claritin since then.    He had pyloric stenosis and surgery at birth.  He also has had an unknown anterior chest wall deformity that may be pectus excavatum.    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    He has lactose tolerance.    He has known Gilbert's syndrome.    Several months ago he noticed a small nodule in his right upper arm.  It has not changed in character.    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.  He now has three other employees.  His parents are in the film industry.  They live in [place] and [place].  He lives here.    [facility] ALLERGY QUESTIONNAIRE LONG  5/28/2019   Do you have symptoms in your head, eyes, ears, nose, or sinuses? Yes   Head or facial pain: No changes since my last visit with this provider   Please describe your head and/or facial pain, if applicable.  sensations on Tongue   Eyes: Itching   Do you have difficulty wearing contacts, if applicable?  Yes   Ears: No symptoms   Nose: No symptoms   Throat: No symptoms   Sinuses: No symptoms   Do you have symptoms in your lungs?  No   Lungs: No symptoms   Is your cough productive of mucus and/or phlegm , if applicable? Yes   Have you ever has a tuberculosis skin test?  No   Have you ever had a lung-function test? No   Have you had a flu shot this year? Yes   When was your flu shot, if applicable? November   Have you had the pneumonia vaccine?  No   Do you have any known problems with your immune system? No   Do you suspect you may have problems with your immune system? Yes   Do you have frequent infections? No   Do you have skin symptoms? Yes   Skin: Itching, Hives, Redness, Rash   When did your hives and/or swelling first begin? April 12   Please note the frequency of hives and/or swelling in days, weeks OR months. Daily   Body location most commonly affected by hives: Arms and upper legs   What are the substances, contacts, activity, foods, or drugs, which you think are related to hives or swelling? Foam mattress, Claritin, ibuprofen   Are you taking this medication regularly? No   Have you associated the hives or swelling with any of the following? Not applicable   Have you had any other associated symptoms with the hives or swelling such as: Not applicable   When did these symptoms first occur? April 12   Are they getting worse or better? Worse   How often do these symptoms occur? Daily   When do these symptoms occur? Daily   Do they occur year round? Yes   If there is any seasonal variation in your symptoms, when are they worse? Not yet.   Is there a particular time of the day or night when the symptoms  are worse? Night   Is there anything you have identified, which can cause symptoms or make them worse? (such as dust, grass, plant or animal products, mold, heat, cold, strong odors, exercise) Scratching.   Is there anything you have identified, which can make symptoms better?  No.   What medications have you tried in the past to help control these symptoms?  Claritin.   Please list all the vitamins or herbal medications you are taking. Daily chewable multivitamin   Please list all the other medications you are taking, including over-the-counter medications. Ibuprophen   Have you ever seen an allergist for these symptoms? No   Have you ever had skin tests? No   Have you ever had any other type of allergy testing? No   Have you ever had allergy shots? No   Do you have food allergies? No   Do you have drug allergies? No   Do you have insect allergies? No   Do you have latex allergies? No   Constitution: No changes since my last visit with this provider   Cardiovascular: No symptoms   Gastrointestinal: No symptoms   Genital/ urinary No symptoms   Musculoskeletal: No symptoms   Endocrine: No symptoms   Hematologic: No symptoms   Please note which family members have allergies or asthma and specify which they have. No allergies. Mother has sarcoidosis.   How long have you lived at your current address? 4 years   Has your residence ever had water or flood damage? No   Is there any evidence of mold in the house? No   Does your house have: Central air conditioning, Electric heat   Does your bedroom have: Potted plants   What type of pillow do you have, for example feather, foam and fiberfill?  Feather   Do you have pets? No   Does anyone in the house smoke? No   What is your occupation?    Do any of the symptoms increase at school or work? Please specify which symptoms, if applicable.  Work: Itchiness   Did you find this questionnaire helpful in addressing your symptoms?  Yes     Physical Examination:  General:  Well-developed, well-nourished, no acute distress.  Head: No sinus tenderness.  Eyes: Conjunctivae:  No bulbar or palpebral conjunctival injection.  Ears: EAC's clear.  TM's clear.  No pre-auricular nodes.  Nose: Nasal Mucosa:  Pink.  Septum: No apparent deviation.  Turbinates:  No significant edema.  Polyps/Mass:  None visible.  Teeth/Gums:  No bleeding noted.  Oropharynx: No exudates.  Neck: Supple without thyromegaly. No cervical lymphadenopathy.    Respiratory/Chest: Effort: Good.  Auscultation:  Clear bilaterally.  Cardiovascular:  No murmur, rubs, or gallop heard.   GI:  Non-tender.  No masses.  No organomegaly.  Extremities:  No cyanosis, clubbing, or edema.  1 centimeter freely movable nodule right upper arm laterally.  Skin: Good turgor.  No urticaria or angioedema.  2+ dermatographia.  Neuro/Psych: Oriented x 3.    Pictures were reviewed on his cell phone on May 28, 2019 and are consistent with urticaria.    Laboratory 04/21/2015:  CBC:  Normal.  CMP:  Bilirubin total 1.4.  Lipid panel:  Cholesterol 154.  Hemoglobin A1c:  4.9.  HIV:  Negative.  RPR:  Nonreactive.  Vitamin-D Level:  26.    Laboratory 05/15/2019:  CBC:  Normal.  CMP:  Bilirubin total 1.9.  Lipid panel:  Cholesterol 177.  Hemoglobin A1c:  4.9.  TSH:  1.435.  RPR:  Nonreactive.  HIV:  Negative.  Hepatitis-C antibody:  Negative.  Hepatitis-B surface antigen:  Negative.    Assessment:  1.  Chronic recurrent urticaria with dermatographia, probably idiopathic.  2.  History of vitamin-D insufficiency.  3.  Gilbert's syndrome.  4.  Probable small lipoma.  5.  S/P pyloric stenosis surgery 1989.  6.  Pectus excavatum.  7.  Lactose intolerance.    Recommendations:  1.  Laboratory as ordered.  2.  Start cetirizine 10 milligrams a day.  May take up to 40 milligrams a day.  3.  Chest x-ray.  4.  Return to clinic in one week.

## 2019-05-28 NOTE — LETTER
May 28, 2019      Chidi Joseph MD  [address]  [address]  [facility] [address]           [facility]  [address]  [facility] [address]  Phone: [phone]  Fax: [phone]          Patient: Haroon Hernadez   MR Number: [id number]   YOB: 1990   Date of Visit: 5/28/2019       Dear Dr. Chidi Joseph:    Thank you for referring Haroon Hernadez to me for evaluation. Attached you will find relevant portions of my assessment and plan of care.    If you have questions, please do not hesitate to call me. I look forward to following Haroon Hernadez along with you.    Sincerely,    PAULA Gerardo III, MD    Enclosure  CC:  No Recipients    If you would like to receive this communication electronically, please contact [email or web address] or [phone] to request more information on [employer] Link access.    For providers and/or their staff who would like to refer a patient to [facility], please contact us through our one-stop-shop provider referral line, [facility], at [phone].    If you feel you have received this communication in error or would no longer like to receive these types of communications, please e-mail [email or web address]

## 2019-05-29 ENCOUNTER — TELEPHONE (OUTPATIENT)
Dept: ALLERGY | Facility: CLINIC | Age: 29
End: 2019-05-29

## 2019-05-29 RX ORDER — ERGOCALCIFEROL 1.25 MG/1
50000 CAPSULE ORAL
Qty: 12 CAPSULE | Refills: 1 | Status: SHIPPED | OUTPATIENT
Start: 2019-05-29 | End: 2019-12-24

## 2019-05-29 NOTE — TELEPHONE ENCOUNTER
After Dr. Gerardo reviewed chest x-ray, he is recommending a chest CT due to mild abnormalities.  Notified patient and he would like to review chest x-ray prior to having chest CT scan done.  Patient has appointment on 6/4/19.

## 2019-05-30 ENCOUNTER — OFFICE VISIT (OUTPATIENT)
Dept: ALLERGY | Facility: CLINIC | Age: 29
End: 2019-05-30
Payer: COMMERCIAL

## 2019-05-30 ENCOUNTER — TELEPHONE (OUTPATIENT)
Dept: ALLERGY | Facility: CLINIC | Age: 29
End: 2019-05-30

## 2019-05-30 VITALS
BODY MASS INDEX: 22.62 KG/M2 | SYSTOLIC BLOOD PRESSURE: 120 MMHG | HEIGHT: 73 IN | WEIGHT: 170.63 LBS | DIASTOLIC BLOOD PRESSURE: 60 MMHG

## 2019-05-30 DIAGNOSIS — Q67.6 CONGENITAL PECTUS EXCAVATUM: ICD-10-CM

## 2019-05-30 DIAGNOSIS — E80.4 GILBERT DISEASE: ICD-10-CM

## 2019-05-30 DIAGNOSIS — L50.1 IDIOPATHIC URTICARIA: Primary | ICD-10-CM

## 2019-05-30 DIAGNOSIS — E55.9 VITAMIN D INSUFFICIENCY: ICD-10-CM

## 2019-05-30 PROCEDURE — 99214 OFFICE O/P EST MOD 30 MIN: CPT | Mod: S$GLB,,, | Performed by: ALLERGY & IMMUNOLOGY

## 2019-05-30 PROCEDURE — 3008F PR BODY MASS INDEX (BMI) DOCUMENTED: ICD-10-PCS | Mod: CPTII,S$GLB,, | Performed by: ALLERGY & IMMUNOLOGY

## 2019-05-30 PROCEDURE — 99999 PR PBB SHADOW E&M-EST. PATIENT-LVL III: CPT | Mod: PBBFAC,,, | Performed by: ALLERGY & IMMUNOLOGY

## 2019-05-30 PROCEDURE — 99214 PR OFFICE/OUTPT VISIT, EST, LEVL IV, 30-39 MIN: ICD-10-PCS | Mod: S$GLB,,, | Performed by: ALLERGY & IMMUNOLOGY

## 2019-05-30 PROCEDURE — 99999 PR PBB SHADOW E&M-EST. PATIENT-LVL III: ICD-10-PCS | Mod: PBBFAC,,, | Performed by: ALLERGY & IMMUNOLOGY

## 2019-05-30 PROCEDURE — 3008F BODY MASS INDEX DOCD: CPT | Mod: CPTII,S$GLB,, | Performed by: ALLERGY & IMMUNOLOGY

## 2019-05-30 NOTE — TELEPHONE ENCOUNTER
Patient will be coming in for 3:40 follow up.      ----- Message from Elizabeth A Bosworth, LPN sent at 5/30/2019  8:30 AM CDT -----  Contact: Pt       ----- Message -----  From: Yamilex Gaines RN  Sent: 5/29/2019   6:04 PM  To: Akin Maza III Staff        ----- Message -----  From: Josselyn Gaviria  Sent: 5/29/2019   6:00 PM  To: Akin SCHAEFER Staff    Pt called to inform the staff that he does want to accept the appt that was offered to him. Pt would like a call to confirm the time.       Pt can contacted at  [phone]

## 2019-05-30 NOTE — PROGRESS NOTES
Haroon Hernadez returns to clinic today for continued evaluation of urticaria and angioedema.  He is here alone.  He was last seen May 28, 2019.    Since his last visit, he has continued to have urticaria.  He has been taking his Zyrtec once a day.  He has not increase this dose.    The lesions are red, raised, and pruritic.  They do not last longer than several hours.    The lesions do not hurt or bruise.  He has not had any angioedema.    He has had a low vitamin-D level in the past.  He was taking multivitamins daily but discontinued this around the time he started having urticaria.  He thinks it did contain vitamin-D.    He has congenital pectus excavatum.  He had a chest x-ray done about seven years ago by cardiologist.  He does think that he will be able to get that chest x-ray.  His parents now live in [place].    [facility] PE[facility] ALLERGY QUESTIONNAIRE SHORT 5/30/2019   Are you taking any new medications since your last visit? No   Constitution: No symptoms   Head or facial pain: No symptoms   Eyes: No symptoms   Ears: No symptoms   Nose: No symptoms   Throat: No symptoms   Sinuses: No symptoms   Lungs: No symptoms   Skin: Rash   Cardiovascular: No symptoms   Gastrointestinal: No symptoms   Genital/ urinary No symptoms   Musculoskeletal: No symptoms   Neurologic: No symptoms   Endocrine: No symptoms   Hematologic: No symptoms     Physical Examination:  General: Well-developed, well-nourished, no acute distress.  Head: No sinus tenderness.  Eyes: Conjunctivae:  No bulbar or palpebral conjunctival injection.  Ears: EAC's clear.  TM's clear.  No pre-auricular nodes.  Nose: Nasal Mucosa:  Pink.  Septum: No apparent deviation.  Turbinates:  No significant edema.  Polyps/Mass:  None visible.  Teeth/Gums:  No bleeding noted.  Oropharynx: No exudates.  Neck: Supple without thyromegaly. No cervical lymphadenopathy.    Respiratory/Chest: Effort: Good.  Auscultation:  Clear bilaterally.  Cardiovascular:  No murmur, rubs,  or gallop heard.   Skin: Good turgor.  No urticaria or angioedema.  2+ dermatographia.  Neuro/Psych: Oriented x 3.    Pictures were reviewed on his cell phone on May 28, 2019 and are consistent with urticaria.    Laboratory 04/21/2015:  CBC:  Normal.  CMP:  Bilirubin total 1.4.  Lipid panel:  Cholesterol 154.  Hemoglobin A1c:  4.9.  HIV:  Negative.  RPR:  Nonreactive.  Vitamin-D Level:  26.    Laboratory 05/15/2019:  CBC:  Normal.  CMP:  Bilirubin total 1.9.  Lipid panel:  Cholesterol 177.  Hemoglobin A1c:  4.9.  TSH:  1.435.  RPR:  Nonreactive.  HIV:  Negative.  Hepatitis-C antibody:  Negative.  Hepatitis-B surface antigen:  Negative.    Laboratory 05/28/2019:  Thyroid peroxidase antibody level:  Less than 6.0.  Thyroglobulin antibody level:  Less than 4.0.  Serum tryptase:  3.0.  Vitamin-D Level:  26.  SPEP:  Normal.    Chest x-ray 05/28/2019:  Heart size normal.  There is small ill-defined opacity identified at the left lung base possible small area of infiltrate or a tiny nodule.  The patient previous chest x-ray would be helpful comparison.  If the another available CT scan would be helpful for further study.  Follow-up study recommended.  The lungs are otherwise clear.  No pleural effusion.    Assessment:  1.  Chronic recurrent urticaria with dermatographia, probably idiopathic.  2.  Vitamin-D insufficiency.  3.  Gilbert's syndrome.  4.  Probable small lipoma.  5.  S/P pyloric stenosis surgery 1989.  6.  Pectus excavatum.  7.  Lactose intolerance.    Recommendations:  1.  Obtain previous chest x-ray for comparison.  2.  Consider chest CT if needed.  3.  Continue cetirizine 10 milligrams a day.  May take up to 40 milligrams a day if needed.  4.  Start vitamin-D replacement.  5.  Return to clinic in 4 to 6 weeks.    Allergic mechanisms and treatment options were reviewed in detail.  Urticaria and angioedema were reviewed.

## 2019-07-10 ENCOUNTER — PATIENT MESSAGE (OUTPATIENT)
Dept: ALLERGY | Facility: CLINIC | Age: 29
End: 2019-07-10

## 2019-07-16 ENCOUNTER — TELEPHONE (OUTPATIENT)
Dept: ALLERGY | Facility: CLINIC | Age: 29
End: 2019-07-16

## 2019-07-16 NOTE — TELEPHONE ENCOUNTER
----- Message from PAULA Gerardo III, MD sent at 7/15/2019  2:55 PM CDT -----  Contact: [facility]   He does have a refill on his last Rx that he should get.    ----- Message -----  From: Roxann Hamm LPN  Sent: 7/12/2019   3:16 PM  To: PAULA Gerardo III, MD    Is this patient to continue the Vitamin D therapy ? Looks like the last office visit was on 5/30/19 and at that time you wanted patient to start therapy for 12 wks and f/u in 4-6wks to re-evaluate.( guess the questions is, does he need a refill if he will have completed a 12 week therapy without seeing you back in the office?)  Patient does not have a follow up appointment schedule according to computer.    Please advise.  Thanks  ----- Message -----  From: Vanessa Smith  Sent: 7/12/2019   2:25 PM  To: Akin Maza III Staff    Needs Advice    Reason for call: pharmacy is calling to speak with the nurse they need a prescription approval so they can dispense the pts medication for  Vitamin ergocalciferol (ERGOCALCIFEROL) 50,000 unit Cap        Communication Preference: can you please call pharmacy at [phone] ref number  [id number]    Additional Information: none    EMILIA

## 2019-07-17 ENCOUNTER — HOSPITAL ENCOUNTER (OUTPATIENT)
Dept: RADIOLOGY | Facility: HOSPITAL | Age: 29
Discharge: HOME | End: 2019-07-17
Attending: ALLERGY & IMMUNOLOGY
Payer: COMMERCIAL

## 2019-07-17 DIAGNOSIS — Q67.6 CONGENITAL PECTUS EXCAVATUM: ICD-10-CM

## 2019-07-17 PROCEDURE — 71250 CT THORAX DX C-: CPT | Mod: 26,,, | Performed by: RADIOLOGY

## 2019-07-17 PROCEDURE — 71250 CT CHEST WITHOUT CONTRAST: ICD-10-PCS | Mod: 26,,, | Performed by: RADIOLOGY

## 2019-07-17 PROCEDURE — 71250 CT THORAX DX C-: CPT | Mod: TC

## 2019-10-01 ENCOUNTER — PATIENT OUTREACH (OUTPATIENT)
Dept: ADMINISTRATIVE | Facility: OTHER | Age: 29
End: 2019-10-01

## 2019-10-02 ENCOUNTER — OFFICE VISIT (OUTPATIENT)
Dept: SURGERY | Facility: CLINIC | Age: 29
End: 2019-10-02
Payer: COMMERCIAL

## 2019-10-02 VITALS
HEART RATE: 84 BPM | WEIGHT: 168.44 LBS | SYSTOLIC BLOOD PRESSURE: 147 MMHG | HEIGHT: 73 IN | BODY MASS INDEX: 22.32 KG/M2 | DIASTOLIC BLOOD PRESSURE: 87 MMHG

## 2019-10-02 DIAGNOSIS — K60.1 CHRONIC ANAL FISSURE: Primary | ICD-10-CM

## 2019-10-02 PROCEDURE — 99204 PR OFFICE/OUTPT VISIT, NEW, LEVL IV, 45-59 MIN: ICD-10-PCS | Mod: S$GLB,,, | Performed by: NURSE PRACTITIONER

## 2019-10-02 PROCEDURE — 99999 PR PBB SHADOW E&M-EST. PATIENT-LVL III: CPT | Mod: PBBFAC,,, | Performed by: NURSE PRACTITIONER

## 2019-10-02 PROCEDURE — 99999 PR PBB SHADOW E&M-EST. PATIENT-LVL III: ICD-10-PCS | Mod: PBBFAC,,, | Performed by: NURSE PRACTITIONER

## 2019-10-02 PROCEDURE — 3008F PR BODY MASS INDEX (BMI) DOCUMENTED: ICD-10-PCS | Mod: CPTII,S$GLB,, | Performed by: NURSE PRACTITIONER

## 2019-10-02 PROCEDURE — 3008F BODY MASS INDEX DOCD: CPT | Mod: CPTII,S$GLB,, | Performed by: NURSE PRACTITIONER

## 2019-10-02 PROCEDURE — 99204 OFFICE O/P NEW MOD 45 MIN: CPT | Mod: S$GLB,,, | Performed by: NURSE PRACTITIONER

## 2019-10-02 NOTE — PROGRESS NOTES
Patient ID:  Haroon Hernadez is a 29 y.o. male     Chief Complaint: No chief complaint on file.       HPI:  Haroon Hernadez presents to colon and rectal surgery with a complaint of rectal pain and bleeding for two weeks. He has had anal fissures in the past. He self treats this with increasing fiber substantially and warm compress. He began doing this, which has helped but has not alleviated the pain. He feels like the pain is radiating to his scrotum as well. Pain with BMs. No issues with urination. Taking metamucil TID. He has used nitroglycerin in the past with improvement.     No prior colonoscopy  Grandmother with colon cancer   No prior rectal sureries     ROS:     Review of Systems   Constitutional: Negative for appetite change, chills, fatigue, fever and unexpected weight change.   Respiratory: Negative for shortness of breath.    Cardiovascular: Negative for chest pain.   Gastrointestinal: Positive for blood in stool and rectal pain. Negative for abdominal distention, abdominal pain, anal bleeding, constipation, diarrhea, nausea and vomiting.   Genitourinary: Positive for testicular pain. Negative for difficulty urinating, frequency and penile pain.       Review of patient's allergies indicates:  No Known Allergies  Past Medical History:   Diagnosis Date    Anxiety     Gilbert disease     Urticaria      Past Surgical History:   Procedure Laterality Date    plyoric      plyoricsynosis    ULNAR NERVE REPAIR      WISDOM TOOTH EXTRACTION       Family History   Problem Relation Age of Onset    Sarcoidosis Mother     Diabetes Father     Hypertension Brother     Melanoma Neg Hx      Current Outpatient Medications on File Prior to Visit   Medication Sig    ergocalciferol (ERGOCALCIFEROL) 50,000 unit Cap Take 1 capsule (50,000 Units total) by mouth every 7 days.     No current facility-administered medications on file prior to visit.        PE:  Vitals:    10/02/19 1509   BP: (!) 147/87   Pulse:  84     Physical Exam   Constitutional: He is oriented to person, place, and time and well-developed, well-nourished, and in no distress. No distress.   HENT:   Head: Normocephalic and atraumatic.   Pulmonary/Chest: Effort normal. No respiratory distress.   Abdominal: Soft. He exhibits no distension. There is no tenderness.   Genitourinary:   Genitourinary Comments: Anterior anal fissure, very tender. No visualized scrotal abnormality.    Musculoskeletal: Normal range of motion.   Neurological: He is alert and oriented to person, place, and time. GCS score is 15.   Skin: Skin is warm and dry. No rash noted. No erythema.   Psychiatric: Affect normal.       Impression:  Encounter Diagnosis   Name Primary?    Chronic anal fissure Yes       PLAN:    Increased fiber intake (20-25 grams/day) and fluid intake (8-10 glasses water/day)  Daily fiber supplement  Soaks/sitz baths  Avoid excessive trauma/straining if possible  Topical nitroglycerin oitnment  RTO 6 weeks - if no improvement, will consider LIAS.

## 2019-10-02 NOTE — LETTER
October 2, 2019      Prakash Trammell MD  [address]  [facility] [address]           [facility]  [address]  [facility] [address]  Phone: [phone]          Patient: Haroon Hernadez   MR Number: [id number]   YOB: 1990   Date of Visit: 10/2/2019       Dear Dr. Prakash Trammell:    Thank you for referring Haroon Hernadez to me for evaluation. Attached you will find relevant portions of my assessment and plan of care.    If you have questions, please do not hesitate to call me. I look forward to following Haroon Hernadez along with you.    Sincerely,    Sophia Francisco, NP    Enclosure  CC:  No Recipients    If you would like to receive this communication electronically, please contact [email or web address] or [phone] to request more information on [employer] Link access.    For providers and/or their staff who would like to refer a patient to [facility], please contact us through our one-stop-shop provider referral line, [facility] , at [phone].    If you feel you have received this communication in error or would no longer like to receive these types of communications, please e-mail [email or web address]         
mother, uncle

## 2019-10-16 ENCOUNTER — PATIENT OUTREACH (OUTPATIENT)
Dept: ADMINISTRATIVE | Facility: OTHER | Age: 29
End: 2019-10-16

## 2019-10-17 ENCOUNTER — OFFICE VISIT (OUTPATIENT)
Dept: OPTOMETRY | Facility: CLINIC | Age: 29
End: 2019-10-17
Payer: COMMERCIAL

## 2019-10-17 DIAGNOSIS — H40.013 OAG (OPEN ANGLE GLAUCOMA) SUSPECT, LOW RISK, BILATERAL: Primary | ICD-10-CM

## 2019-10-17 DIAGNOSIS — H43.391 VITREOUS FLOATERS OF RIGHT EYE: ICD-10-CM

## 2019-10-17 PROCEDURE — 92012 PR EYE EXAM, EST PATIENT,INTERMED: ICD-10-PCS | Mod: S$GLB,,, | Performed by: OPTOMETRIST

## 2019-10-17 PROCEDURE — 99999 PR PBB SHADOW E&M-EST. PATIENT-LVL II: CPT | Mod: PBBFAC,,, | Performed by: OPTOMETRIST

## 2019-10-17 PROCEDURE — 99999 PR PBB SHADOW E&M-EST. PATIENT-LVL II: ICD-10-PCS | Mod: PBBFAC,,, | Performed by: OPTOMETRIST

## 2019-10-17 PROCEDURE — 92012 INTRM OPH EXAM EST PATIENT: CPT | Mod: S$GLB,,, | Performed by: OPTOMETRIST

## 2019-10-17 NOTE — PROGRESS NOTES
HPI     Pt is coming for intermittent  floaters OD   Symptoms started 4-5 days ago  Denies flashes of light OU     Last edited by Kisha Hummel on 10/17/2019  9:36 AM. (History)        ROS     Negative for: Constitutional, Gastrointestinal, Neurological, Skin,   Genitourinary, Musculoskeletal, HENT, Endocrine, Cardiovascular, Eyes,   Respiratory, Psychiatric, Allergic/Imm, Heme/Lymph    Last edited by Latoya Neves, OD on 10/17/2019 10:00 AM. (History)        Assessment /Plan     For exam results, see Encounter Report.    OAG (open angle glaucoma) suspect, low risk, bilateral    Vitreous floaters of right eye    1. (-) FHx.  IOP 10 OD, OS. Last IOP 19 OD, OS. C/d0 0.55 OD 0.6 OS. 3/26/19 Normal OCT OU. Retest in 1-2 years. Educated pt on findings. Monitor.   2. No e/o h/b/t 360 degrees OU. Monitor for worsening of symptoms or S/Sx of RD. RTC 1 mo.

## 2019-11-19 ENCOUNTER — OFFICE VISIT (OUTPATIENT)
Dept: OPTOMETRY | Facility: CLINIC | Age: 29
End: 2019-11-19
Payer: COMMERCIAL

## 2019-11-19 DIAGNOSIS — H43.391 VITREOUS FLOATERS OF RIGHT EYE: Primary | ICD-10-CM

## 2019-11-19 PROCEDURE — 92014 COMPRE OPH EXAM EST PT 1/>: CPT | Mod: S$GLB,,, | Performed by: OPTOMETRIST

## 2019-11-19 PROCEDURE — 92014 PR EYE EXAM, EST PATIENT,COMPREHESV: ICD-10-PCS | Mod: S$GLB,,, | Performed by: OPTOMETRIST

## 2019-11-19 PROCEDURE — 99999 PR PBB SHADOW E&M-EST. PATIENT-LVL II: CPT | Mod: PBBFAC,,, | Performed by: OPTOMETRIST

## 2019-11-19 PROCEDURE — 99999 PR PBB SHADOW E&M-EST. PATIENT-LVL II: ICD-10-PCS | Mod: PBBFAC,,, | Performed by: OPTOMETRIST

## 2019-11-19 NOTE — PROGRESS NOTES
HPI     Pt is coming in for f/u for floaters OD  No changes since last visit     Last edited by Kisha Hummel on 11/19/2019  3:51 PM. (History)            Assessment /Plan     For exam results, see Encounter Report.    Vitreous floaters of right eye      No e/o h/b/t 360 degrees OU. Monitor for worsening of symptoms or S/Sx of RD.   RTc 1 year routine

## 2019-12-24 RX ORDER — ERGOCALCIFEROL 1.25 MG/1
CAPSULE ORAL
Qty: 13 CAPSULE | Refills: 1 | Status: SHIPPED | OUTPATIENT
Start: 2019-12-24 | End: 2020-04-20

## 2020-04-20 RX ORDER — ERGOCALCIFEROL 1.25 MG/1
CAPSULE ORAL
Qty: 5 CAPSULE | Refills: 4 | Status: SHIPPED | OUTPATIENT
Start: 2020-04-20 | End: 2020-08-10

## 2020-05-24 ENCOUNTER — PATIENT MESSAGE (OUTPATIENT)
Dept: SURGERY | Facility: CLINIC | Age: 30
End: 2020-05-24

## 2020-05-28 ENCOUNTER — TELEPHONE (OUTPATIENT)
Dept: SURGERY | Facility: CLINIC | Age: 30
End: 2020-05-28

## 2020-05-29 ENCOUNTER — TELEPHONE (OUTPATIENT)
Dept: SURGERY | Facility: CLINIC | Age: 30
End: 2020-05-29

## 2020-05-29 NOTE — TELEPHONE ENCOUNTER
----- Message from Evonne Martinez sent at 5/29/2020  8:50 AM CDT -----  Contact: pt   Pt wants to know if he can receive Botox injection at next visit. Please call

## 2020-06-01 ENCOUNTER — OFFICE VISIT (OUTPATIENT)
Dept: SURGERY | Facility: CLINIC | Age: 30
End: 2020-06-01
Payer: COMMERCIAL

## 2020-06-01 VITALS
HEART RATE: 83 BPM | WEIGHT: 162.25 LBS | SYSTOLIC BLOOD PRESSURE: 136 MMHG | HEIGHT: 73 IN | DIASTOLIC BLOOD PRESSURE: 73 MMHG | BODY MASS INDEX: 21.5 KG/M2

## 2020-06-01 DIAGNOSIS — K60.1 CHRONIC ANAL FISSURE: Primary | ICD-10-CM

## 2020-06-01 PROCEDURE — 99213 PR OFFICE/OUTPT VISIT, EST, LEVL III, 20-29 MIN: ICD-10-PCS | Mod: S$GLB,,, | Performed by: COLON & RECTAL SURGERY

## 2020-06-01 PROCEDURE — 99999 PR PBB SHADOW E&M-EST. PATIENT-LVL III: ICD-10-PCS | Mod: PBBFAC,,, | Performed by: COLON & RECTAL SURGERY

## 2020-06-01 PROCEDURE — 3008F BODY MASS INDEX DOCD: CPT | Mod: CPTII,S$GLB,, | Performed by: COLON & RECTAL SURGERY

## 2020-06-01 PROCEDURE — 99999 PR PBB SHADOW E&M-EST. PATIENT-LVL III: CPT | Mod: PBBFAC,,, | Performed by: COLON & RECTAL SURGERY

## 2020-06-01 PROCEDURE — 99213 OFFICE O/P EST LOW 20 MIN: CPT | Mod: S$GLB,,, | Performed by: COLON & RECTAL SURGERY

## 2020-06-01 PROCEDURE — 3008F PR BODY MASS INDEX (BMI) DOCUMENTED: ICD-10-PCS | Mod: CPTII,S$GLB,, | Performed by: COLON & RECTAL SURGERY

## 2020-06-01 NOTE — PROGRESS NOTES
"Subjective:       Patient ID: Haroon Hernadez is a 30 y.o. male.    Chief Complaint: Anal Fissure    HPI  31 yo M with chronic anal fissure "for many years" - has had intermittent improvement with conservative measures but never any durable resolution of symptoms.  +Sharp pain with BM's followed by dull pain that lasts for hours. No significant rectal bleeding. No constipation.  Has tried fiber therapy, topical NTG, topical diltiazem without durable improvement.    Last colonoscopy - never  No family hx of CRC or IBD.    Review of patient's allergies indicates:  No Known Allergies    Past Medical History:   Diagnosis Date    Anxiety     Gilbert disease     Urticaria        Past Surgical History:   Procedure Laterality Date    plyoric      plyoricsynosis    ULNAR NERVE REPAIR      WISDOM TOOTH EXTRACTION         Current Outpatient Medications   Medication Sig Dispense Refill    FLUARIX QUAD [id number], PF, 60 mcg (15 mcg x 4)/0.5 mL Syrg ADM 0.5ML IM UTD  0    nitroglycerin (NITROGLYN) 0.2 % ointment Apply to anorectal area 2-3 times a day 30 g 0    VITAMIN D2 1,250 mcg (50,000 unit) capsule TAKE 1 CAPSULE BY MOUTH  EVERY 7 DAYS 5 capsule 4    cetirizine (ZYRTEC) 10 MG tablet Take 10 mg by mouth once daily.      cholecalciferol, vitamin D3, 1,250 mcg (50,000 unit) capsule       triamcinolone acetonide 0.5% (KENALOG) 0.5 % Crea       triamcinolone acetonide 0.5% (KENALOG) 0.5 % Crea AISHA EXT AA BID       No current facility-administered medications for this visit.        Family History   Problem Relation Age of Onset    Sarcoidosis Mother     Diabetes Father     Hypertension Brother     Melanoma Neg Hx        Social History     Socioeconomic History    Marital status: Single     Spouse name: Not on file    Number of children: Not on file    Years of education: Not on file    Highest education level: Not on file   Occupational History    Not on file   Social Needs    Financial resource strain: " Not on file    Food insecurity:     Worry: Not on file     Inability: Not on file    Transportation needs:     Medical: Not on file     Non-medical: Not on file   Tobacco Use    Smoking status: Never Smoker    Smokeless tobacco: Never Used   Substance and Sexual Activity    Alcohol use: No    Drug use: No    Sexual activity: Yes     Partners: Female   Lifestyle    Physical activity:     Days per week: Not on file     Minutes per session: Not on file    Stress: Not on file   Relationships    Social connections:     Talks on phone: Not on file     Gets together: Not on file     Attends [employer] service: Not on file     Active member of club or organization: Not on file     Attends meetings of clubs or organizations: Not on file     Relationship status: Not on file   Other Topics Concern    Not on file   Social History Narrative    Not on file       Review of Systems   Constitutional: Negative for chills and fever.   HENT: Negative for congestion and sinus pressure.    Eyes: Negative for visual disturbance.   Respiratory: Negative for cough and shortness of breath.    Cardiovascular: Negative for chest pain and palpitations.   Gastrointestinal: Positive for rectal pain. Negative for abdominal distention, abdominal pain, anal bleeding, blood in stool, constipation, diarrhea, nausea and vomiting.   Endocrine: Negative for cold intolerance and heat intolerance.   Genitourinary: Negative for dysuria and frequency.   Musculoskeletal: Negative for arthralgias and back pain.   Skin: Negative for rash.   Allergic/Immunologic: Negative for immunocompromised state.   Neurological: Negative for dizziness, light-headedness and headaches.   Psychiatric/Behavioral: Negative for confusion. The patient is nervous/anxious.        Objective:      Physical Exam   Constitutional: He is oriented to person, place, and time. He appears well-developed and well-nourished.   HENT:   Head: Normocephalic and atraumatic.   Eyes:  Conjunctivae are normal.   Pulmonary/Chest: Effort normal. No respiratory distress.   Abdominal: Soft. He exhibits no distension and no mass. There is no tenderness. There is no rebound and no guarding.   Genitourinary:   Genitourinary Comments: Perineum - normal perianal skin, no mass, +chronic-appearing AML fissure, no external hemorrhoids  LISSET/Anoscopy - not done due to fissure   Neurological: He is alert and oriented to person, place, and time.   Skin: Skin is warm and dry. No erythema.         Lab Results   Component Value Date    WBC 5.59 05/15/2019    HGB 15.2 05/15/2019    HCT 44.0 05/15/2019    MCV 84 05/15/2019     05/15/2019     BMP  Lab Results   Component Value Date     05/15/2019    K 4.1 05/15/2019     05/15/2019    CO2 26 05/15/2019    BUN 20 05/15/2019    CREATININE 0.9 05/15/2019    CALCIUM 9.8 05/15/2019    ANIONGAP 10 05/15/2019    ESTGFRAFRICA >60 05/15/2019    EGFRNONAA >60 05/15/2019     CMP  Sodium   Date Value Ref Range Status   05/15/2019 141 136 - 145 mmol/L Final     Potassium   Date Value Ref Range Status   05/15/2019 4.1 3.5 - 5.1 mmol/L Final     Chloride   Date Value Ref Range Status   05/15/2019 105 95 - 110 mmol/L Final     CO2   Date Value Ref Range Status   05/15/2019 26 23 - 29 mmol/L Final     Glucose   Date Value Ref Range Status   05/15/2019 84 70 - 110 mg/dL Final     BUN, Bld   Date Value Ref Range Status   05/15/2019 20 6 - 20 mg/dL Final     Creatinine   Date Value Ref Range Status   05/15/2019 0.9 0.5 - 1.4 mg/dL Final     Calcium   Date Value Ref Range Status   05/15/2019 9.8 8.7 - 10.5 mg/dL Final     Total Protein   Date Value Ref Range Status   05/15/2019 7.8 6.0 - 8.4 g/dL Final     Albumin   Date Value Ref Range Status   05/15/2019 4.8 3.5 - 5.2 g/dL Final     Total Bilirubin   Date Value Ref Range Status   05/15/2019 1.9 (H) 0.1 - 1.0 mg/dL Final     Comment:     For infants and newborns, interpretation of results should be based  on gestational  age, weight and in agreement with clinical  observations.  Premature Infant recommended reference ranges:  Up to 24 hours.............<8.0 mg/dL  Up to 48 hours............<12.0 mg/dL  3-5 days..................<15.0 mg/dL  6-29 days.................<15.0 mg/dL       Alkaline Phosphatase   Date Value Ref Range Status   05/15/2019 63 55 - 135 U/L Final     AST   Date Value Ref Range Status   05/15/2019 19 10 - 40 U/L Final     ALT   Date Value Ref Range Status   05/15/2019 22 10 - 44 U/L Final     Anion Gap   Date Value Ref Range Status   05/15/2019 10 8 - 16 mmol/L Final     eGFR if    Date Value Ref Range Status   05/15/2019 >60 >60 mL/min/1.73 m^2 Final     eGFR if non    Date Value Ref Range Status   05/15/2019 >60 >60 mL/min/1.73 m^2 Final     Comment:     Calculation used to obtain the estimated glomerular filtration  rate (eGFR) is the CKD-EPI equation.        No results found for: CEA        Assessment:       1. Chronic anal fissure        Plan:   He has failed non-surgical treatment - discussed LIAS with him and he would like to proceed.    I have discussed the procedure at length with Haroon Hernadez.  We discussed the rationale, risks, benefits, and alternatives in depth.  We discussed the expected outcomes and potential complications including but not limited to bleeding, infection, recurrence, prolonged pain, need for further procedures and altered continence.  He verbalized his understanding of the procedure and wishes to proceed.  Written consent was obtained.    He needs to coordinate scheduling with work - he will call back once he has a better sense of when he would like to schedule surgery.    Danis Holder MD, FACS, FASCRS  Senior Staff Surgeon  Department of Colon & Rectal Surgery     This note was created using voice recognition software, and may contain some unrecognized transcriptional errors.

## 2020-06-01 NOTE — LETTER
June 4, 2020      Prakash Trammell MD  [address]  [facility] [address]           [facility]  [address]  [facility] [address]  Phone: [phone]          Patient: Haroon Hernadez   MR Number: [id number]   YOB: 1990   Date of Visit: 6/1/2020       Dear Dr Trammell:    Thank you for referring Haroon Hernadez to me for evaluation. Attached you will find relevant portions of my assessment and plan of care.    If you have questions, please do not hesitate to call me. I look forward to following Haroon Hernadez along with you.    Sincerely,    Danis Holder MD    Enclosure  CC:  No Recipients    If you would like to receive this communication electronically, please contact [email or web address] or [phone] to request more information on [employer] Link access.    For providers and/or their staff who would like to refer a patient to [facility], please contact us through our one-stop-shop provider referral line, [facility] [place], at [phone].    If you feel you have received this communication in error or would no longer like to receive these types of communications, please e-mail [email or web address]

## 2020-07-17 ENCOUNTER — OFFICE VISIT (OUTPATIENT)
Dept: URGENT CARE | Facility: CLINIC | Age: 30
End: 2020-07-17
Payer: COMMERCIAL

## 2020-07-17 VITALS
TEMPERATURE: 98 F | HEIGHT: 74 IN | SYSTOLIC BLOOD PRESSURE: 149 MMHG | HEART RATE: 103 BPM | BODY MASS INDEX: 21.17 KG/M2 | OXYGEN SATURATION: 98 % | WEIGHT: 165 LBS | DIASTOLIC BLOOD PRESSURE: 85 MMHG

## 2020-07-17 DIAGNOSIS — S61.411A LACERATION OF RIGHT HAND WITHOUT FOREIGN BODY, INITIAL ENCOUNTER: Primary | ICD-10-CM

## 2020-07-17 PROCEDURE — 99213 OFFICE O/P EST LOW 20 MIN: CPT | Mod: S$GLB,,, | Performed by: NURSE PRACTITIONER

## 2020-07-17 PROCEDURE — 99213 PR OFFICE/OUTPT VISIT, EST, LEVL III, 20-29 MIN: ICD-10-PCS | Mod: S$GLB,,, | Performed by: NURSE PRACTITIONER

## 2020-07-17 RX ORDER — DOXYCYCLINE 100 MG/1
100 CAPSULE ORAL EVERY 12 HOURS
Qty: 14 CAPSULE | Refills: 0 | Status: SHIPPED | OUTPATIENT
Start: 2020-07-17 | End: 2020-07-24

## 2020-07-17 RX ORDER — CLINDAMYCIN PHOSPHATE 150 MG/ML
600 INJECTION, SOLUTION INTRAVENOUS
Status: COMPLETED | OUTPATIENT
Start: 2020-07-17 | End: 2020-07-17

## 2020-07-17 RX ORDER — MUPIROCIN 20 MG/G
OINTMENT TOPICAL 3 TIMES DAILY
Qty: 2 G | Refills: 0 | Status: SHIPPED | OUTPATIENT
Start: 2020-07-17 | End: 2020-08-10

## 2020-07-17 RX ADMIN — CLINDAMYCIN PHOSPHATE 600 MG: 150 INJECTION, SOLUTION INTRAVENOUS at 05:07

## 2020-07-17 NOTE — PROGRESS NOTES
"Subjective:       Patient ID: Haroon Hernadez is a 30 y.o. male.    Vitals:  height is 6' 2" (1.88 m) and weight is 74.8 kg (165 lb). His temperature is 98 °F (36.7 °C). His blood pressure is 149/85 (abnormal) and his pulse is 103. His oxygen saturation is 98%.     Chief Complaint: Laceration    LACERATION THAT OCCURRED ON YESTERDAY AT [facility]. IMMEDIATELY CLEANED WOUND AND APPLIED BUTTERFLY BANDAGES. WOUND IS CLOSED HERE TODAY ON ARRIVAL. PT WITH MILD SWELLING TO RIGHT PALM AND THUMB. DENIES FEVER AND CHILLS. NO DECREASE IN ROM.     Laceration   The incident occurred 12 to 24 hours ago (9pm). Pain location: right palm  Injury mechanism: concrete in [facility]. The pain is at a severity of 7/10. The pain is moderate. He reports no foreign bodies present. His tetanus status is UTD (4 years ago).       Constitution: Negative for fatigue.   HENT: Negative for facial swelling and facial trauma.    Neck: Negative for neck stiffness.   Cardiovascular: Negative for chest trauma.   Eyes: Negative for eye trauma, double vision and blurred vision.   Gastrointestinal: Negative for abdominal trauma, abdominal pain and rectal bleeding.   Genitourinary: Negative for hematuria, genital trauma and pelvic pain.   Musculoskeletal: Negative for pain, trauma, joint swelling, abnormal ROM of joint and pain with walking.   Skin: Negative for color change, wound, abrasion, laceration and erythema.   Neurological: Negative for dizziness, history of vertigo, light-headedness, coordination disturbances, altered mental status and loss of consciousness.   Hematologic/Lymphatic: Negative for history of bleeding disorder.   Psychiatric/Behavioral: Negative for altered mental status.       Objective:      Physical Exam   Constitutional: He is oriented to person, place, and time. He appears well-developed.   HENT:   Head: Normocephalic and atraumatic. Head is without abrasion, without contusion and without laceration.   Ears:   Right " Ear: External ear normal.   Left Ear: External ear normal.   Nose: Nose normal.   Mouth/Throat: Oropharynx is clear and moist and mucous membranes are normal.   Eyes: Pupils are equal, round, and reactive to light. Conjunctivae, EOM and lids are normal.   Neck: Trachea normal, full passive range of motion without pain and phonation normal. Neck supple.   Cardiovascular: Normal rate, regular rhythm and normal heart sounds.   Pulmonary/Chest: Effort normal and breath sounds normal. No stridor. No respiratory distress.   Musculoskeletal: Normal range of motion.      Right hand: He exhibits laceration.   Neurological: He is alert and oriented to person, place, and time.   Skin: Skin is warm, dry, intact and no rash. Capillary refill takes less than 2 seconds. Lacerations - upper ext.:  right handabrasion, burn, bruising, erythema and ecchymosisPsychiatric: His speech is normal and behavior is normal. Judgment and thought content normal.   Nursing note and vitals reviewed.            Assessment:       1. Laceration of right hand without foreign body, initial encounter        Plan:       TREATED WITH DOXYCYCLINE AND CLINDAMYCIN. I HAVE ADVISED PT TO MONITOR FOR WORSENING SYMPTOMS SUCH AS FEVER, INCREASED SWELLING, DRAINAGE OR DECREASE IN ROM. IF OCCURS, HE SHOULD GO TO ER FOR POSSIBLE IV ANTIBIOTICS. PT VERBALIZED UNDERSTANDING.  Laceration of right hand without foreign body, initial encounter  -     doxycycline (MONODOX) 100 MG capsule; Take 1 capsule (100 mg total) by mouth every 12 (twelve) hours. for 7 days  Dispense: 14 capsule; Refill: 0  -     clindamycin injection 600 mg  -     mupirocin (BACTROBAN) 2 % ointment; Apply topically 3 (three) times daily.  Dispense: 2 g; Refill: 0         Patient Instructions                                             Cellulitis  If your condition worsens or fails to improve we recommend that you receive another evaluation at the ER immediately or contact your PCP to discuss your  concerns or return here. You must understand that you've received an urgent care treatment only and that you may be released before all your medical problems are known or treated. You the patient will arrange for follouwp care as instructed.     Soak affected in warm water with epsom salts several times a day. Dilute 2 cups per gallon of water. If you cannot soak  apply warm compresses to the affected area for 20 min, 3-5 times per day and apply warm compresses frequently. If you cannot soak, use the shower head.  Use warm compresses for 20 minutes 3-5 times a day. Avoid picking or manipulating the wound. Clean the wound twice a day and put the antibiotic ointment on it. You should seek ER treatment if fever, increase pain, swelling or other signs of increasing infection.   If you were prescribed antibiotics, please take them to completion  If you are female and on BCP use additional methods to prevent pregnancy while on antibiotics and for one cycle after.   If you were given narcotics do not drive or operate heavy equipment or machinery while taking these medications.   Tylenol or ibuprofen can also be used as directed for pain unless you have an allergy to them or medical condition such as stomach ulcers, kidney or liver disease or blood thinners etc for which you should not be taking these type of medications.   Return in 48- 72 hours for recheck.

## 2020-07-17 NOTE — PATIENT INSTRUCTIONS
Cellulitis  If your condition worsens or fails to improve we recommend that you receive another evaluation at the ER immediately or contact your PCP to discuss your concerns or return here. You must understand that you've received an urgent care treatment only and that you may be released before all your medical problems are known or treated. You the patient will arrange for follouwp care as instructed.     Soak affected in warm water with epsom salts several times a day. Dilute 2 cups per gallon of water. If you cannot soak  apply warm compresses to the affected area for 20 min, 3-5 times per day and apply warm compresses frequently. If you cannot soak, use the shower head.  Use warm compresses for 20 minutes 3-5 times a day. Avoid picking or manipulating the wound. Clean the wound twice a day and put the antibiotic ointment on it. You should seek ER treatment if fever, increase pain, swelling or other signs of increasing infection.   If you were prescribed antibiotics, please take them to completion  If you are female and on BCP use additional methods to prevent pregnancy while on antibiotics and for one cycle after.   If you were given narcotics do not drive or operate heavy equipment or machinery while taking these medications.   Tylenol or ibuprofen can also be used as directed for pain unless you have an allergy to them or medical condition such as stomach ulcers, kidney or liver disease or blood thinners etc for which you should not be taking these type of medications.   Return in 48- 72 hours for recheck.

## 2020-07-18 ENCOUNTER — PATIENT MESSAGE (OUTPATIENT)
Dept: ALLERGY | Facility: CLINIC | Age: 30
End: 2020-07-18

## 2020-07-18 ENCOUNTER — NURSE TRIAGE (OUTPATIENT)
Dept: ADMINISTRATIVE | Facility: CLINIC | Age: 30
End: 2020-07-18

## 2020-07-18 ENCOUNTER — PATIENT MESSAGE (OUTPATIENT)
Dept: INTERNAL MEDICINE | Facility: CLINIC | Age: 30
End: 2020-07-18

## 2020-07-18 ENCOUNTER — ANESTHESIA (OUTPATIENT)
Dept: SURGERY | Facility: HOSPITAL | Age: 30
End: 2020-07-18
Payer: COMMERCIAL

## 2020-07-18 ENCOUNTER — HOSPITAL ENCOUNTER (OUTPATIENT)
Facility: HOSPITAL | Age: 30
Discharge: HOME | End: 2020-07-20
Attending: EMERGENCY MEDICINE | Admitting: EMERGENCY MEDICINE
Payer: COMMERCIAL

## 2020-07-18 ENCOUNTER — ANESTHESIA EVENT (OUTPATIENT)
Dept: SURGERY | Facility: HOSPITAL | Age: 30
End: 2020-07-18
Payer: COMMERCIAL

## 2020-07-18 DIAGNOSIS — S60.551A FOREIGN BODY, HAND, SUPERFICIAL, RIGHT, INITIAL ENCOUNTER: Primary | ICD-10-CM

## 2020-07-18 DIAGNOSIS — S60.551A FOREIGN BODY OF RIGHT HAND, INITIAL ENCOUNTER: ICD-10-CM

## 2020-07-18 DIAGNOSIS — L08.9: ICD-10-CM

## 2020-07-18 DIAGNOSIS — L02.519 CELLULITIS AND ABSCESS OF HAND: ICD-10-CM

## 2020-07-18 DIAGNOSIS — S61.411A: ICD-10-CM

## 2020-07-18 DIAGNOSIS — L03.119 CELLULITIS AND ABSCESS OF HAND: ICD-10-CM

## 2020-07-18 PROBLEM — L03.113 CELLULITIS OF RIGHT UPPER EXTREMITY: Status: RESOLVED | Noted: 2020-07-18 | Resolved: 2020-07-18

## 2020-07-18 PROBLEM — L03.113 CELLULITIS OF RIGHT UPPER EXTREMITY: Status: ACTIVE | Noted: 2020-07-18

## 2020-07-18 LAB
ANION GAP SERPL CALC-SCNC: 9 MMOL/L (ref 8–16)
BASOPHILS # BLD AUTO: 0.04 K/UL (ref 0–0.2)
BASOPHILS NFR BLD: 0.5 % (ref 0–1.9)
BUN SERPL-MCNC: 7 MG/DL (ref 6–20)
CALCIUM SERPL-MCNC: 10.5 MG/DL (ref 8.7–10.5)
CHLORIDE SERPL-SCNC: 105 MMOL/L (ref 95–110)
CO2 SERPL-SCNC: 27 MMOL/L (ref 23–29)
CREAT SERPL-MCNC: 0.9 MG/DL (ref 0.5–1.4)
CRP SERPL-MCNC: 43.4 MG/L (ref 0–8.2)
DIFFERENTIAL METHOD: ABNORMAL
EOSINOPHIL # BLD AUTO: 0 K/UL (ref 0–0.5)
EOSINOPHIL NFR BLD: 0.1 % (ref 0–8)
ERYTHROCYTE [DISTWIDTH] IN BLOOD BY AUTOMATED COUNT: 12.9 % (ref 11.5–14.5)
ERYTHROCYTE [SEDIMENTATION RATE] IN BLOOD BY WESTERGREN METHOD: 16 MM/HR (ref 0–23)
EST. GFR  (AFRICAN AMERICAN): >60 ML/MIN/1.73 M^2
EST. GFR  (NON AFRICAN AMERICAN): >60 ML/MIN/1.73 M^2
GLUCOSE SERPL-MCNC: 101 MG/DL (ref 70–110)
GRAM STN SPEC: NORMAL
GRAM STN SPEC: NORMAL
HCT VFR BLD AUTO: 48.8 % (ref 40–54)
HGB BLD-MCNC: 16.3 G/DL (ref 14–18)
IMM GRANULOCYTES # BLD AUTO: 0.01 K/UL (ref 0–0.04)
IMM GRANULOCYTES NFR BLD AUTO: 0.1 % (ref 0–0.5)
LYMPHOCYTES # BLD AUTO: 1.6 K/UL (ref 1–4.8)
LYMPHOCYTES NFR BLD: 19.2 % (ref 18–48)
MCH RBC QN AUTO: 29.7 PG (ref 27–31)
MCHC RBC AUTO-ENTMCNC: 33.4 G/DL (ref 32–36)
MCV RBC AUTO: 89 FL (ref 82–98)
MONOCYTES # BLD AUTO: 0.5 K/UL (ref 0.3–1)
MONOCYTES NFR BLD: 5.4 % (ref 4–15)
NEUTROPHILS # BLD AUTO: 6.3 K/UL (ref 1.8–7.7)
NEUTROPHILS NFR BLD: 74.7 % (ref 38–73)
NRBC BLD-RTO: 0 /100 WBC
PLATELET # BLD AUTO: 191 K/UL (ref 150–350)
PMV BLD AUTO: 10.8 FL (ref 9.2–12.9)
POTASSIUM SERPL-SCNC: 4.2 MMOL/L (ref 3.5–5.1)
PROCALCITONIN SERPL IA-MCNC: 0.02 NG/ML
RBC # BLD AUTO: 5.49 M/UL (ref 4.6–6.2)
SARS-COV-2 RDRP RESP QL NAA+PROBE: NEGATIVE
SODIUM SERPL-SCNC: 141 MMOL/L (ref 136–145)
WBC # BLD AUTO: 8.4 K/UL (ref 3.9–12.7)

## 2020-07-18 PROCEDURE — D9220A PRA ANESTHESIA: ICD-10-PCS | Mod: CRNA,,, | Performed by: NURSE ANESTHETIST, CERTIFIED REGISTERED

## 2020-07-18 PROCEDURE — 87205 SMEAR GRAM STAIN: CPT

## 2020-07-18 PROCEDURE — 99285 PR EMERGENCY DEPT VISIT,LEVEL V: ICD-10-PCS | Mod: ,,, | Performed by: EMERGENCY MEDICINE

## 2020-07-18 PROCEDURE — 63600175 PHARM REV CODE 636 W HCPCS: Performed by: EMERGENCY MEDICINE

## 2020-07-18 PROCEDURE — 99285 EMERGENCY DEPT VISIT HI MDM: CPT | Mod: 25

## 2020-07-18 PROCEDURE — D9220A PRA ANESTHESIA: Mod: CRNA,,, | Performed by: NURSE ANESTHETIST, CERTIFIED REGISTERED

## 2020-07-18 PROCEDURE — G0378 HOSPITAL OBSERVATION PER HR: HCPCS

## 2020-07-18 PROCEDURE — 87075 CULTR BACTERIA EXCEPT BLOOD: CPT

## 2020-07-18 PROCEDURE — 90714 TD VACC NO PRESV 7 YRS+ IM: CPT | Performed by: EMERGENCY MEDICINE

## 2020-07-18 PROCEDURE — 87070 CULTURE OTHR SPECIMN AEROBIC: CPT

## 2020-07-18 PROCEDURE — 80048 BASIC METABOLIC PNL TOTAL CA: CPT

## 2020-07-18 PROCEDURE — 85025 COMPLETE CBC W/AUTO DIFF WBC: CPT

## 2020-07-18 PROCEDURE — D9220A PRA ANESTHESIA: ICD-10-PCS | Mod: ANES,,, | Performed by: ANESTHESIOLOGY

## 2020-07-18 PROCEDURE — 25000003 PHARM REV CODE 250: Performed by: STUDENT IN AN ORGANIZED HEALTH CARE EDUCATION/TRAINING PROGRAM

## 2020-07-18 PROCEDURE — 99285 EMERGENCY DEPT VISIT HI MDM: CPT | Mod: ,,, | Performed by: EMERGENCY MEDICINE

## 2020-07-18 PROCEDURE — 87015 SPECIMEN INFECT AGNT CONCNTJ: CPT

## 2020-07-18 PROCEDURE — 36000706: Performed by: ORTHOPAEDIC SURGERY

## 2020-07-18 PROCEDURE — 85652 RBC SED RATE AUTOMATED: CPT

## 2020-07-18 PROCEDURE — 87077 CULTURE AEROBIC IDENTIFY: CPT

## 2020-07-18 PROCEDURE — 71000033 HC RECOVERY, INTIAL HOUR: Performed by: ORTHOPAEDIC SURGERY

## 2020-07-18 PROCEDURE — 63600175 PHARM REV CODE 636 W HCPCS: Performed by: NURSE ANESTHETIST, CERTIFIED REGISTERED

## 2020-07-18 PROCEDURE — 25000003 PHARM REV CODE 250: Performed by: NURSE ANESTHETIST, CERTIFIED REGISTERED

## 2020-07-18 PROCEDURE — 84145 PROCALCITONIN (PCT): CPT

## 2020-07-18 PROCEDURE — 87116 MYCOBACTERIA CULTURE: CPT

## 2020-07-18 PROCEDURE — 86140 C-REACTIVE PROTEIN: CPT

## 2020-07-18 PROCEDURE — 71000015 HC POSTOP RECOV 1ST HR: Performed by: ORTHOPAEDIC SURGERY

## 2020-07-18 PROCEDURE — 87186 SC STD MICRODIL/AGAR DIL: CPT

## 2020-07-18 PROCEDURE — 87040 BLOOD CULTURE FOR BACTERIA: CPT

## 2020-07-18 PROCEDURE — 25000003 PHARM REV CODE 250: Performed by: PHYSICIAN ASSISTANT

## 2020-07-18 PROCEDURE — 63600175 PHARM REV CODE 636 W HCPCS: Performed by: STUDENT IN AN ORGANIZED HEALTH CARE EDUCATION/TRAINING PROGRAM

## 2020-07-18 PROCEDURE — 90471 IMMUNIZATION ADMIN: CPT | Performed by: EMERGENCY MEDICINE

## 2020-07-18 PROCEDURE — 37000008 HC ANESTHESIA 1ST 15 MINUTES: Performed by: ORTHOPAEDIC SURGERY

## 2020-07-18 PROCEDURE — 99220 PR INITIAL OBSERVATION CARE,LEVL III: CPT | Mod: ,,, | Performed by: PHYSICIAN ASSISTANT

## 2020-07-18 PROCEDURE — U0002 COVID-19 LAB TEST NON-CDC: HCPCS

## 2020-07-18 PROCEDURE — 10121 INC&RMVL FB SUBQ TISS COMP: CPT | Mod: RT,,, | Performed by: ORTHOPAEDIC SURGERY

## 2020-07-18 PROCEDURE — 99204 PR OFFICE/OUTPT VISIT, NEW, LEVL IV, 45-59 MIN: ICD-10-PCS | Mod: 57,ICN,, | Performed by: ORTHOPAEDIC SURGERY

## 2020-07-18 PROCEDURE — 87206 SMEAR FLUORESCENT/ACID STAI: CPT

## 2020-07-18 PROCEDURE — 87102 FUNGUS ISOLATION CULTURE: CPT

## 2020-07-18 PROCEDURE — 99204 OFFICE O/P NEW MOD 45 MIN: CPT | Mod: 57,ICN,, | Performed by: ORTHOPAEDIC SURGERY

## 2020-07-18 PROCEDURE — 96365 THER/PROPH/DIAG IV INF INIT: CPT | Mod: 59

## 2020-07-18 PROCEDURE — D9220A PRA ANESTHESIA: Mod: ANES,,, | Performed by: ANESTHESIOLOGY

## 2020-07-18 PROCEDURE — 99220 PR INITIAL OBSERVATION CARE,LEVL III: ICD-10-PCS | Mod: ,,, | Performed by: PHYSICIAN ASSISTANT

## 2020-07-18 PROCEDURE — 96366 THER/PROPH/DIAG IV INF ADDON: CPT

## 2020-07-18 PROCEDURE — 36000707: Performed by: ORTHOPAEDIC SURGERY

## 2020-07-18 PROCEDURE — 96375 TX/PRO/DX INJ NEW DRUG ADDON: CPT | Mod: 59

## 2020-07-18 PROCEDURE — 10121 PR REMOVE FOREIGN BODY COMPLIC: ICD-10-PCS | Mod: RT,,, | Performed by: ORTHOPAEDIC SURGERY

## 2020-07-18 PROCEDURE — 37000009 HC ANESTHESIA EA ADD 15 MINS: Performed by: ORTHOPAEDIC SURGERY

## 2020-07-18 RX ORDER — LEVOFLOXACIN 5 MG/ML
500 INJECTION, SOLUTION INTRAVENOUS
Status: DISCONTINUED | OUTPATIENT
Start: 2020-07-18 | End: 2020-07-20

## 2020-07-18 RX ORDER — AMOXICILLIN 250 MG
1 CAPSULE ORAL 2 TIMES DAILY PRN
Status: DISCONTINUED | OUTPATIENT
Start: 2020-07-18 | End: 2020-07-20 | Stop reason: HOSPADM

## 2020-07-18 RX ORDER — ONDANSETRON 4 MG/1
4 TABLET, ORALLY DISINTEGRATING ORAL EVERY 8 HOURS PRN
Status: DISCONTINUED | OUTPATIENT
Start: 2020-07-18 | End: 2020-07-20 | Stop reason: HOSPADM

## 2020-07-18 RX ORDER — SUCCINYLCHOLINE CHLORIDE 20 MG/ML
INJECTION INTRAMUSCULAR; INTRAVENOUS
Status: DISCONTINUED | OUTPATIENT
Start: 2020-07-18 | End: 2020-07-18

## 2020-07-18 RX ORDER — MORPHINE SULFATE 2 MG/ML
2 INJECTION, SOLUTION INTRAMUSCULAR; INTRAVENOUS EVERY 4 HOURS PRN
Status: DISCONTINUED | OUTPATIENT
Start: 2020-07-18 | End: 2020-07-18

## 2020-07-18 RX ORDER — MIDAZOLAM HYDROCHLORIDE 1 MG/ML
INJECTION, SOLUTION INTRAMUSCULAR; INTRAVENOUS
Status: DISCONTINUED | OUTPATIENT
Start: 2020-07-18 | End: 2020-07-18

## 2020-07-18 RX ORDER — IBUPROFEN 400 MG/1
800 TABLET ORAL EVERY 8 HOURS
Status: DISCONTINUED | OUTPATIENT
Start: 2020-07-18 | End: 2020-07-20

## 2020-07-18 RX ORDER — SODIUM CHLORIDE 0.9 % (FLUSH) 0.9 %
10 SYRINGE (ML) INJECTION
Status: DISCONTINUED | OUTPATIENT
Start: 2020-07-18 | End: 2020-07-20 | Stop reason: HOSPADM

## 2020-07-18 RX ORDER — PROPOFOL 10 MG/ML
VIAL (ML) INTRAVENOUS
Status: DISCONTINUED | OUTPATIENT
Start: 2020-07-18 | End: 2020-07-18

## 2020-07-18 RX ORDER — LIDOCAINE HYDROCHLORIDE 10 MG/ML
10 INJECTION, SOLUTION EPIDURAL; INFILTRATION; INTRACAUDAL; PERINEURAL
Status: COMPLETED | OUTPATIENT
Start: 2020-07-18 | End: 2020-07-18

## 2020-07-18 RX ORDER — IPRATROPIUM BROMIDE AND ALBUTEROL SULFATE 2.5; .5 MG/3ML; MG/3ML
3 SOLUTION RESPIRATORY (INHALATION) EVERY 4 HOURS PRN
Status: DISCONTINUED | OUTPATIENT
Start: 2020-07-18 | End: 2020-07-20 | Stop reason: HOSPADM

## 2020-07-18 RX ORDER — ONDANSETRON 2 MG/ML
INJECTION INTRAMUSCULAR; INTRAVENOUS
Status: DISCONTINUED | OUTPATIENT
Start: 2020-07-18 | End: 2020-07-18

## 2020-07-18 RX ORDER — OXYCODONE HYDROCHLORIDE 5 MG/1
5 TABLET ORAL
Status: DISCONTINUED | OUTPATIENT
Start: 2020-07-18 | End: 2020-07-18 | Stop reason: HOSPADM

## 2020-07-18 RX ORDER — KETAMINE HCL IN 0.9 % NACL 50 MG/5 ML
SYRINGE (ML) INTRAVENOUS
Status: DISCONTINUED | OUTPATIENT
Start: 2020-07-18 | End: 2020-07-18

## 2020-07-18 RX ORDER — ONDANSETRON 2 MG/ML
4 INJECTION INTRAMUSCULAR; INTRAVENOUS DAILY PRN
Status: DISCONTINUED | OUTPATIENT
Start: 2020-07-18 | End: 2020-07-18 | Stop reason: HOSPADM

## 2020-07-18 RX ORDER — GLUCAGON 1 MG
1 KIT INJECTION
Status: DISCONTINUED | OUTPATIENT
Start: 2020-07-18 | End: 2020-07-20 | Stop reason: HOSPADM

## 2020-07-18 RX ORDER — IBUPROFEN 200 MG
16 TABLET ORAL
Status: DISCONTINUED | OUTPATIENT
Start: 2020-07-18 | End: 2020-07-20 | Stop reason: HOSPADM

## 2020-07-18 RX ORDER — LIDOCAINE HYDROCHLORIDE 20 MG/ML
INJECTION INTRAVENOUS
Status: DISCONTINUED | OUTPATIENT
Start: 2020-07-18 | End: 2020-07-18

## 2020-07-18 RX ORDER — IBUPROFEN 200 MG
24 TABLET ORAL
Status: DISCONTINUED | OUTPATIENT
Start: 2020-07-18 | End: 2020-07-20 | Stop reason: HOSPADM

## 2020-07-18 RX ORDER — OXYCODONE HYDROCHLORIDE 5 MG/1
5 TABLET ORAL EVERY 6 HOURS PRN
Status: DISCONTINUED | OUTPATIENT
Start: 2020-07-18 | End: 2020-07-20 | Stop reason: HOSPADM

## 2020-07-18 RX ORDER — DEXAMETHASONE SODIUM PHOSPHATE 4 MG/ML
INJECTION, SOLUTION INTRA-ARTICULAR; INTRALESIONAL; INTRAMUSCULAR; INTRAVENOUS; SOFT TISSUE
Status: DISCONTINUED | OUTPATIENT
Start: 2020-07-18 | End: 2020-07-18

## 2020-07-18 RX ORDER — ACETAMINOPHEN 10 MG/ML
INJECTION, SOLUTION INTRAVENOUS
Status: DISCONTINUED | OUTPATIENT
Start: 2020-07-18 | End: 2020-07-18

## 2020-07-18 RX ORDER — CLINDAMYCIN PHOSPHATE 900 MG/50ML
900 INJECTION, SOLUTION INTRAVENOUS
Status: DISCONTINUED | OUTPATIENT
Start: 2020-07-18 | End: 2020-07-19

## 2020-07-18 RX ORDER — KETOROLAC TROMETHAMINE 30 MG/ML
15 INJECTION, SOLUTION INTRAMUSCULAR; INTRAVENOUS EVERY 6 HOURS PRN
Status: DISCONTINUED | OUTPATIENT
Start: 2020-07-18 | End: 2020-07-18

## 2020-07-18 RX ORDER — CHOLECALCIFEROL (VITAMIN D3) 25 MCG
1000 TABLET ORAL DAILY
Status: DISCONTINUED | OUTPATIENT
Start: 2020-07-19 | End: 2020-07-20 | Stop reason: HOSPADM

## 2020-07-18 RX ORDER — CLINDAMYCIN PHOSPHATE 900 MG/50ML
INJECTION, SOLUTION INTRAVENOUS
Status: DISCONTINUED | OUTPATIENT
Start: 2020-07-18 | End: 2020-07-18

## 2020-07-18 RX ORDER — FENTANYL CITRATE 50 UG/ML
25 INJECTION, SOLUTION INTRAMUSCULAR; INTRAVENOUS EVERY 5 MIN PRN
Status: DISCONTINUED | OUTPATIENT
Start: 2020-07-18 | End: 2020-07-18 | Stop reason: HOSPADM

## 2020-07-18 RX ORDER — FENTANYL CITRATE 50 UG/ML
INJECTION, SOLUTION INTRAMUSCULAR; INTRAVENOUS
Status: DISCONTINUED | OUTPATIENT
Start: 2020-07-18 | End: 2020-07-18

## 2020-07-18 RX ORDER — TALC
6 POWDER (GRAM) TOPICAL NIGHTLY PRN
Status: DISCONTINUED | OUTPATIENT
Start: 2020-07-18 | End: 2020-07-19

## 2020-07-18 RX ADMIN — CLINDAMYCIN PHOSPHATE 900 MG: 18 INJECTION, SOLUTION INTRAVENOUS at 04:07

## 2020-07-18 RX ADMIN — CLOSTRIDIUM TETANI TOXOID ANTIGEN (FORMALDEHYDE INACTIVATED) AND CORYNEBACTERIUM DIPHTHERIAE TOXOID ANTIGEN (FORMALDEHYDE INACTIVATED) 0.5 ML: 5; 2 INJECTION, SUSPENSION INTRAMUSCULAR at 12:07

## 2020-07-18 RX ADMIN — Medication 20 MG: at 04:07

## 2020-07-18 RX ADMIN — ONDANSETRON 4 MG: 2 INJECTION, SOLUTION INTRAMUSCULAR; INTRAVENOUS at 04:07

## 2020-07-18 RX ADMIN — SODIUM CHLORIDE, SODIUM GLUCONATE, SODIUM ACETATE, POTASSIUM CHLORIDE, MAGNESIUM CHLORIDE, SODIUM PHOSPHATE, DIBASIC, AND POTASSIUM PHOSPHATE: .53; .5; .37; .037; .03; .012; .00082 INJECTION, SOLUTION INTRAVENOUS at 03:07

## 2020-07-18 RX ADMIN — SUCCINYLCHOLINE CHLORIDE 160 MG: 20 INJECTION, SOLUTION INTRAMUSCULAR; INTRAVENOUS at 04:07

## 2020-07-18 RX ADMIN — FENTANYL CITRATE 50 MCG: 50 INJECTION, SOLUTION INTRAMUSCULAR; INTRAVENOUS at 04:07

## 2020-07-18 RX ADMIN — LEVOFLOXACIN 500 MG: 500 INJECTION, SOLUTION INTRAVENOUS at 03:07

## 2020-07-18 RX ADMIN — PROPOFOL 200 MG: 10 INJECTION, EMULSION INTRAVENOUS at 04:07

## 2020-07-18 RX ADMIN — IBUPROFEN 800 MG: 200 TABLET, FILM COATED ORAL at 05:07

## 2020-07-18 RX ADMIN — LIDOCAINE HYDROCHLORIDE 100 MG: 10 INJECTION, SOLUTION EPIDURAL; INFILTRATION; INTRACAUDAL; PERINEURAL at 12:07

## 2020-07-18 RX ADMIN — LIDOCAINE HYDROCHLORIDE 100 MG: 20 INJECTION, SOLUTION INTRAVENOUS at 04:07

## 2020-07-18 RX ADMIN — MIDAZOLAM HYDROCHLORIDE 2 MG: 1 INJECTION, SOLUTION INTRAMUSCULAR; INTRAVENOUS at 03:07

## 2020-07-18 RX ADMIN — DEXAMETHASONE SODIUM PHOSPHATE 4 MG: 4 INJECTION, SOLUTION INTRAMUSCULAR; INTRAVENOUS at 04:07

## 2020-07-18 RX ADMIN — FENTANYL CITRATE 25 MCG: 50 INJECTION, SOLUTION INTRAMUSCULAR; INTRAVENOUS at 04:07

## 2020-07-18 RX ADMIN — DOXYCYCLINE 100 MG: 100 INJECTION, POWDER, LYOPHILIZED, FOR SOLUTION INTRAVENOUS at 02:07

## 2020-07-18 RX ADMIN — OXYCODONE HYDROCHLORIDE 5 MG: 5 TABLET ORAL at 05:07

## 2020-07-18 RX ADMIN — ACETAMINOPHEN 1000 MG: 10 INJECTION, SOLUTION INTRAVENOUS at 04:07

## 2020-07-18 NOTE — ANESTHESIA PROCEDURE NOTES
Intubation  Performed by: Tracey Barrett CRNA  Authorized by: Bill Das MD     Intubation:     Induction:  Rapid sequence induction    Intubated:  Postinduction    Attempts:  N/a    Attempted By:  CRNA    Method of Intubation:  Direct    Blade:  Guevara 2    Laryngeal View Grade: Grade IIA - cords partially seen      Difficult Airway Encountered?: No      Complications:  None    Airway Device:  Oral endotracheal tube    Airway Device Size:  7.5    Style/Cuff Inflation:  Cuffed    Inflation Amount (mL):  5    Tube secured:  24    Secured at:  The lips    Placement Verified By:  Capnometry    Complicating Factors:  None    Findings Post-Intubation:  BS equal bilateral

## 2020-07-18 NOTE — PLAN OF CARE
Pt vital signs wnl.  Pt verbalizes pain is tolerable. Pt verbalizes no nausea.  Rt hand dressing cdi with ice pack in place and hand elevated.

## 2020-07-18 NOTE — PROVIDER PROGRESS NOTES - EMERGENCY DEPT.
Encounter Date: 7/18/2020    ED Physician Progress Notes           1:59 PM Lab notified ED of negative COVID-19 results. Pt currently in the ED, will notify provider.
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
## 2020-07-18 NOTE — ANESTHESIA POSTPROCEDURE EVALUATION
Anesthesia Post Evaluation    Patient: Haoron Hernadez    Procedure(s) Performed: Procedure(s) (LRB):  Incision and Drainage Right hand with foreign body removal;  hand table; cysto tubing (Right)    Final Anesthesia Type: general    Patient location during evaluation: PACU  Patient participation: Yes- Able to Participate  Level of consciousness: awake and alert and oriented  Post-procedure vital signs: reviewed and stable  Pain management: adequate  Airway patency: patent    PONV status at discharge: No PONV  Anesthetic complications: no      Cardiovascular status: blood pressure returned to baseline and hemodynamically stable  Respiratory status: unassisted, spontaneous ventilation and room air  Hydration status: euvolemic  Follow-up not needed.          Vitals Value Taken Time   /86 07/18/20 1802   Temp 37.1 °C (98.8 °F) 07/18/20 1800   Pulse 83 07/18/20 1815   Resp 15 07/18/20 1815   SpO2 100 % 07/18/20 1815   Vitals shown include unvalidated device data.      Event Time   Out of Recovery 17:32:00         Pain/Rjei Score: Pain Rating Prior to Med Admin: 5 (7/18/2020  5:27 PM)  Pain Rating Post Med Admin: 4 (7/18/2020  6:12 PM)  Reji Score: 9 (7/18/2020  6:12 PM)

## 2020-07-18 NOTE — H&P
"[facility] Medicine  History & Physical    Patient Name: Haroon Hernadez  MRN: [id number]  Admission Date: 7/18/2020  Attending Physician: Greg Mccall MD   Primary Care Provider: Prakash Trammell MD    [facility] Medicine Team: Networked reference to record PCT  Eonc Cardona PA-C     Patient information was obtained from patient, past medical records and ER records.     Subjective:     Principal Problem:Foreign body, hand, superficial, right, initial encounter    Chief Complaint:   Chief Complaint   Patient presents with    Hand Pain     + right sided hand pains, laceration on Friday evening " A piece of concrete cut my hand in the [facility]". Seen at  yesterday told to come to ER for increased swelling and redness. Denies fever or chills        HPI: Haroon Hernadez is a 30 y.o. M with idiopathic urticaria, Gilbert syndrome, h/o chikungunya who presents to [facility] 7[place]18 for further evaluation of R hand pain with associated swelling and redness. Patient reports that he was kayaking in [facility] Friday (7/17) when he cut his hand on concrete while trying to exit the waterway. He cleaned the wound himself (bottled water+ 5 minutes under filtered water) and placed Steri-Strips at home yesterday.  He was seen by urgent care yesterday for worsening pain/redness.  Patient was given clindamycin injection and started on doxy for cellulitis. He presents with worsening symptoms and redness tracking all the way up in the forearm to the antecubital fossa.  He is right-handed, works in IT.  He denies any f/c/sweats, NVD.  Denies etoh, tobacco, drug use.    ED: AFVSS, no leukocytosis with mild tachycardia.  Issues with lab results, but reportedly has elevated CRP.  Hg 16.3, plts 191.  Plain films show 7x4mm foreign body at base of thumb. BCx collected and tetanus updated.  Ortho consulted and started on levaquin/clinda/doxy.  Plan for OR today.     Past Medical History:   Diagnosis " Date    Anxiety     Gilbert disease     Urticaria        Past Surgical History:   Procedure Laterality Date    plyoric      plyoricsynosis    ULNAR NERVE REPAIR      WISDOM TOOTH EXTRACTION         Review of patient's allergies indicates:  No Known Allergies    Current Facility-Administered Medications on File Prior to Encounter   Medication    [COMPLETED] clindamycin injection 600 mg     Current Outpatient Medications on File Prior to Encounter   Medication Sig    cetirizine (ZYRTEC) 10 MG tablet Take 10 mg by mouth once daily.    cholecalciferol, vitamin D3, 1,250 mcg (50,000 unit) capsule     doxycycline (MONODOX) 100 MG capsule Take 1 capsule (100 mg total) by mouth every 12 (twelve) hours. for 7 days    FLUARIX QUAD [id number], PF, 60 mcg (15 mcg x 4)/0.5 mL Syrg ADM 0.5ML IM UTD    mupirocin (BACTROBAN) 2 % ointment Apply topically 3 (three) times daily.    nitroglycerin (NITROGLYN) 0.2 % ointment Apply to anorectal area 2-3 times a day    triamcinolone acetonide 0.5% (KENALOG) 0.5 % Crea     triamcinolone acetonide 0.5% (KENALOG) 0.5 % Crea AISHA EXT AA BID    VITAMIN D2 1,250 mcg (50,000 unit) capsule TAKE 1 CAPSULE BY MOUTH  EVERY 7 DAYS (Patient not taking: Reported on 7/17/2020)     Family History     Problem Relation (Age of Onset)    Diabetes Father    Hypertension Brother    Sarcoidosis Mother        Tobacco Use    Smoking status: Never Smoker    Smokeless tobacco: Never Used   Substance and Sexual Activity    Alcohol use: No    Drug use: No    Sexual activity: Yes     Partners: Female     Review of Systems   Constitutional: Negative for chills and fever.   HENT: Negative for ear pain and sore throat.    Eyes: Negative for pain and visual disturbance.   Respiratory: Negative for cough and shortness of breath.    Cardiovascular: Negative for chest pain and palpitations.   Gastrointestinal: Negative for abdominal pain, diarrhea, nausea and vomiting.   Endocrine: Negative for heat  intolerance and polydipsia.   Genitourinary: Negative for dysuria, frequency and urgency.   Musculoskeletal: Negative for back pain and gait problem.        + R hand pain and swelling   Skin: Negative for rash.   Neurological: Negative for dizziness and weakness.   Psychiatric/Behavioral: Negative for confusion. The patient is not nervous/anxious.      Objective:     Vital Signs (Most Recent):  Temp: 98.1 °F (36.7 °C) (07/18/20 1012)  Pulse: 100 (07/18/20 1012)  Resp: 14 (07/18/20 1012)  BP: 129/78 (07/18/20 1012)  SpO2: 98 % (07/18/20 1012) Vital Signs (24h Range):  Temp:  [98 °F (36.7 °C)-98.1 °F (36.7 °C)] 98.1 °F (36.7 °C)  Pulse:  [100-103] 100  Resp:  [14] 14  SpO2:  [98 %] 98 %  BP: (129-149)/(78-85) 129/78     Weight: 74.8 kg (165 lb)  Body mass index is 21.18 kg/m².    Physical Exam  Vitals signs and nursing note reviewed.   Constitutional:       Appearance: He is well-developed.      Comments: Healthy appearing male   HENT:      Head: Normocephalic and atraumatic.   Eyes:      Conjunctiva/sclera: Conjunctivae normal.      Pupils: Pupils are equal, round, and reactive to light.   Neck:      Musculoskeletal: Normal range of motion and neck supple.   Cardiovascular:      Rate and Rhythm: Normal rate and regular rhythm.      Heart sounds: Normal heart sounds.   Pulmonary:      Effort: Pulmonary effort is normal.      Breath sounds: Normal breath sounds.   Abdominal:      General: Bowel sounds are normal.      Palpations: Abdomen is soft.   Musculoskeletal: Normal range of motion.      Comments: R hand wrapped in bandage  R thumb ROM limited at MCP joint  Lymphatic tracking up to axilla   Skin:     General: Skin is warm and dry.   Neurological:      Mental Status: He is alert and oriented to person, place, and time.   Psychiatric:         Behavior: Behavior normal.           CRANIAL NERVES     CN III, IV, VI   Pupils are equal, round, and reactive to light.       Significant Labs: CBC: No results for input(s):  WBC, HGB, HCT, PLT in the last 48 hours.  CMP: No results for input(s): NA, K, CL, CO2, GLU, BUN, CREATININE, CALCIUM, PROT, ALBUMIN, BILITOT, ALKPHOS, AST, ALT, ANIONGAP, EGFRNONAA in the last 48 hours.    Invalid input(s): ESTGFAFR[place]    Significant Imaging: I have reviewed all pertinent imaging results/findings within the past 24 hours.     X-ray Hand 3 View Right    Result Date: 7/18/2020  EXAMINATION: XR HAND COMPLETE 3 VIEW RIGHT CLINICAL HISTORY: infection; TECHNIQUE: PA, lateral, and oblique views of the right hand were performed. COMPARISON: None FINDINGS: Bones are well mineralized.  Overall alignment is within normal limits.  No displaced fracture, dislocation or destructive osseous process.  Joint spaces appear maintained. There is a 7 x 4 mm irregular shaped radiodensity projected within the soft tissues along the palmar aspect of the base of the thumb at the level of the proximal metadiaphyseal junction.  On the lateral view this is approximately 8-9 mm deep to the skin surface, potentially within the hypothenar musculature.  No subcutaneous emphysema.     Base of the thumb 7 x 4 mm irregular shaped foreign body as above.     X-ray Finger 2 Or More Views Right    Result Date: 7/18/2020  EXAMINATION: XR FINGER 2 OR MORE VIEWS RIGHT CLINICAL HISTORY: thumb; TECHNIQUE: Two views right thumb COMPARISON: Non 07/18/2020 e FINDINGS: Opaque density foreign body adjacent to the thumb as before.  No fracture or bone destruction.  Needle overlying the soft tissues noted.     See above     Assessment/Plan:     * Foreign body, hand, superficial, right, initial encounter  Cellulitis and abscess of hand  FB found on plain films after patient presented with laceration while in [facility].  Drainage has been present and fluctuance noted prior to admission.    - ortho consulted and plan for I&D today in OR  - C/w doxy/levaquin to cover for vibrio, atypicals, and gram negative bacteria  - c/w clindamycin for MRSA  coverage  - ID consulted and appreciate recs  - f/u BCx  - pain control    Lavelle disease  - stable  - patient prefers no tylenol products given to him    VTE Risk Mitigation (From admission, onward)         Ordered     IP VTE LOW RISK PATIENT  Once      07/18/20 1522     Place KRIS hose  Until discontinued      07/18/20 1522     Place sequential compression device  Until discontinued      07/18/20 1452                   Enoc Cardona PA-C  Department of Hospital Medicine   [facility]

## 2020-07-18 NOTE — ANESTHESIA PREPROCEDURE EVALUATION
[facility]  Anesthesia Pre-Operative Evaluation         Patient Name: Haroon Hernadez  YOB: 1990  MRN: [id number]    SUBJECTIVE:     Pre-operative evaluation for Procedure(s) (LRB):  Incision and Drainage Right hand with foreign body removal;  hand table; cysto tubing (Right)     07/18/2020    Haroon Hernadez is a 30 y.o. male w/ a significant PMHx of gilberts disease.    Pt fell in water, he put his right hand down against a rock cover with algae, sustained a superficial laceration that he cleaned and placed Steri-Strips at home,     Pt has worsening pain and redness and swelling of the right hand with redness tracking all the way up in the forearm to the antecubital fossa.  He is right-handed, works in IT.    Patient now presents for the above procedure(s).      LDA: None documented.       Peripheral IV - Single Lumen 07/18/20 1202 18 G Left Antecubital (Active)   Site Assessment Clean;Dry;Intact 07/18/20 1202   Line Status Blood return noted 07/18/20 1202   Dressing Status Clean;Dry;Intact 07/18/20 1202   Number of days: 0       Prev airway: None documented.    Drips: None documented.      Patient Active Problem List   Diagnosis    Gilbert disease    Bleeding hemorrhoids    Joint pain    Laceration of right hand with infection    Foreign body, hand, superficial, right, initial encounter    Cellulitis of right upper extremity       Review of patient's allergies indicates:  No Known Allergies    Current Inpatient Medications:   clindamycin (CLEOCIN) IVPB  900 mg Intravenous Q8H    doxycycline (VIBRAMYCIN) IVPB  100 mg Intravenous Q12H    levoFLOXacin  500 mg Intravenous Q24H       No current facility-administered medications on file prior to encounter.      Current Outpatient Medications on File Prior to Encounter   Medication Sig Dispense Refill    cetirizine (ZYRTEC) 10 MG tablet Take 10 mg by mouth once daily.      cholecalciferol, vitamin D3, 1,250 mcg (50,000  unit) capsule       doxycycline (MONODOX) 100 MG capsule Take 1 capsule (100 mg total) by mouth every 12 (twelve) hours. for 7 days 14 capsule 0    FLUARIX QUAD [id number], PF, 60 mcg (15 mcg x 4)/0.5 mL Syrg ADM 0.5ML IM UTD  0    mupirocin (BACTROBAN) 2 % ointment Apply topically 3 (three) times daily. 2 g 0    nitroglycerin (NITROGLYN) 0.2 % ointment Apply to anorectal area 2-3 times a day 30 g 0    triamcinolone acetonide 0.5% (KENALOG) 0.5 % Crea       triamcinolone acetonide 0.5% (KENALOG) 0.5 % Crea AISHA EXT AA BID      VITAMIN D2 1,250 mcg (50,000 unit) capsule TAKE 1 CAPSULE BY MOUTH  EVERY 7 DAYS (Patient not taking: Reported on 7/17/2020) 5 capsule 4       Past Surgical History:   Procedure Laterality Date    plyoric      plyoricsynosis    ULNAR NERVE REPAIR      WISDOM TOOTH EXTRACTION         Social History     Socioeconomic History    Marital status: Single     Spouse name: Not on file    Number of children: Not on file    Years of education: Not on file    Highest education level: Not on file   Occupational History    Not on file   Social Needs    Financial resource strain: Not on file    Food insecurity     Worry: Not on file     Inability: Not on file    Transportation needs     Medical: Not on file     Non-medical: Not on file   Tobacco Use    Smoking status: Never Smoker    Smokeless tobacco: Never Used   Substance and Sexual Activity    Alcohol use: No    Drug use: No    Sexual activity: Yes     Partners: Female   Lifestyle    Physical activity     Days per week: Not on file     Minutes per session: Not on file    Stress: Not on file   Relationships    Social connections     Talks on phone: Not on file     Gets together: Not on file     Attends Christianity service: Not on file     Active member of club or organization: Not on file     Attends meetings of clubs or organizations: Not on file     Relationship status: Not on file   Other Topics Concern    Not on file   Social  History Narrative    Not on file       OBJECTIVE:     Vital Signs Range (Last 24H):  Temp:  [36.7 °C (98 °F)-36.7 °C (98.1 °F)]   Pulse:  [100-103]   Resp:  [14]   BP: (129-149)/(78-85)   SpO2:  [98 %]       Significant Labs:  Lab Results   Component Value Date    WBC 5.59 05/15/2019    HGB 15.2 05/15/2019    HCT 44.0 05/15/2019     05/15/2019    CHOL 177 05/15/2018    TRIG 133 05/15/2018    HDL 46 05/15/2018    ALT 22 05/15/2019    AST 19 05/15/2019     05/15/2019    K 4.1 05/15/2019     05/15/2019    CREATININE 0.9 05/15/2019    BUN 20 05/15/2019    CO2 26 05/15/2019    TSH 1.366 05/15/2019    HGBA1C 4.9 05/15/2018       Diagnostic Studies: No relevant studies.    EKG:   Results for orders placed or performed in visit on 05/02/12   EKG 12-LEAD    Collection Time: 05/02/12  9:44 AM    Narrative    Test Reason : 794.31  Vent. Rate : 087 BPM     Atrial Rate : 087 BPM     P-R Int : 144 ms          QRS Dur : 102 ms      QT Int : 366 ms       P-R-T Axes : 063 062 055 degrees     QTc Int : 440 ms    Age and gender specific analysis  Normal sinus rhythm with sinus arrhythmia  Incomplete right bundle branch block  Otherwise normal ECG  When compared with ECG of 01-MAY-2012 11:08,  No significant change was found  Confirmed by CHERIE FELIPE MD ([id number]) on 5/3/2012 9:33:23 AM    Referred By:             Overread By: CHERIE FELIPE MD       2D ECHO:  TTE:  No results found for this or any previous visit.    JULIA:  No results found for this or any previous visit.    ASSESSMENT/PLAN:                                                                                                            07/18/2020  Haroon Hernadez is a 30 y.o., male.    Anesthesia Evaluation       I have reviewed the Medications.     Review of Systems  Anesthesia Hx:  No problems with previous Anesthesia History of prior surgery of interest to airway management or planning: Denies Family Hx of Anesthesia complications.   Denies  Personal Hx of Anesthesia complications.   Social:  Non-Smoker, No Alcohol Use    Hematology/Oncology:        Denies Current/Recent Cancer -- Denies Cancer in past history:    Cardiovascular:  Cardiovascular Normal  Denies Hypertension.  Denies CABG/stent.  Denies Dysrhythmias.     Pulmonary:   Denies COPD.  Denies Asthma.  Denies Shortness of breath.    Renal/:  Renal/ Normal  Denies Chronic Renal Disease.     Hepatic/GI:  Hepatic/GI Normal  Denies GERD.    Musculoskeletal:  Musculoskeletal Normal    Neurological:  Neurology Normal    Endocrine:  Endocrine Normal        Physical Exam  General:  Well nourished    Airway/Jaw/Neck:  Airway Findings: Mouth Opening: Normal Tongue: Normal  General Airway Assessment: Adult  Mallampati: I  TM Distance: Normal, at least 6 cm        Eyes/Ears/Nose:  EYES/EARS/NOSE FINDINGS: Normal   Dental:  DENTAL FINDINGS: Normal   Chest/Lungs:  Chest/Lungs Clear    Heart/Vascular:  Heart Findings: Normal       Mental Status:  Mental Status Findings:  Cooperative, Alert and Oriented         Anesthesia Plan  Type of Anesthesia, risks & benefits discussed:  Anesthesia Type:  general, regional  Patient's Preference:   Intra-op Monitoring Plan: standard ASA monitors  Intra-op Monitoring Plan Comments:   Post Op Pain Control Plan: per primary service following discharge from PACU, IV/PO Opioids PRN and multimodal analgesia  Post Op Pain Control Plan Comments:   Induction:   IV  Beta Blocker:  Patient is not currently on a Beta-Blocker (No further documentation required).       Informed Consent: Patient understands risks and agrees with Anesthesia plan.  Questions answered. Anesthesia consent signed with patient.  ASA Score: 1     Day of Surgery Review of History & Physical:    H&P update referred to the surgeon.         Ready For Surgery From Anesthesia Perspective.

## 2020-07-18 NOTE — SUBJECTIVE & OBJECTIVE
Past Medical History:   Diagnosis Date    Anxiety     Gilbert disease     Urticaria        Past Surgical History:   Procedure Laterality Date    plyoric      plyoricsynosis    ULNAR NERVE REPAIR      WISDOM TOOTH EXTRACTION         Review of patient's allergies indicates:  No Known Allergies    Current Facility-Administered Medications   Medication    lidocaine (PF) 10 mg/ml (1%) injection 100 mg    tetanus and diphther. tox (PF)(TD)     Current Outpatient Medications   Medication Sig    cetirizine (ZYRTEC) 10 MG tablet Take 10 mg by mouth once daily.    cholecalciferol, vitamin D3, 1,250 mcg (50,000 unit) capsule     doxycycline (MONODOX) 100 MG capsule Take 1 capsule (100 mg total) by mouth every 12 (twelve) hours. for 7 days    FLUARIX QUAD [id number], PF, 60 mcg (15 mcg x 4)/0.5 mL Syrg ADM 0.5ML IM UTD    mupirocin (BACTROBAN) 2 % ointment Apply topically 3 (three) times daily.    nitroglycerin (NITROGLYN) 0.2 % ointment Apply to anorectal area 2-3 times a day    triamcinolone acetonide 0.5% (KENALOG) 0.5 % Crea     triamcinolone acetonide 0.5% (KENALOG) 0.5 % Crea AISHA EXT AA BID    VITAMIN D2 1,250 mcg (50,000 unit) capsule TAKE 1 CAPSULE BY MOUTH  EVERY 7 DAYS (Patient not taking: Reported on 7/17/2020)     Family History     Problem Relation (Age of Onset)    Diabetes Father    Hypertension Brother    Sarcoidosis Mother        Tobacco Use    Smoking status: Never Smoker    Smokeless tobacco: Never Used   Substance and Sexual Activity    Alcohol use: No    Drug use: No    Sexual activity: Yes     Partners: Female     [facility] ED  Objective:     Vital Signs (Most Recent):  Temp: 98.1 °F (36.7 °C) (07/18/20 1012)  Pulse: 100 (07/18/20 1012)  Resp: 14 (07/18/20 1012)  BP: 129/78 (07/18/20 1012)  SpO2: 98 % (07/18/20 1012) Vital Signs (24h Range):  Temp:  [98 °F (36.7 °C)-98.1 °F (36.7 °C)] 98.1 °F (36.7 °C)  Pulse:  [100-103] 100  Resp:  [14] 14  SpO2:  [98 %] 98 %  BP: (129-149)/(78-85)  "129/78     Weight: 74.8 kg (165 lb)  Height: 6' 2" (188 cm)  Body mass index is 21.18 kg/m².    No intake or output data in the 24 hours ending 07/18/20 1226    Ortho/SPM Exam     Awake/alert/oriented x3, No acute distress, Afebrile, Vital signs stable  Good inspiratory effort with unlaboured breathing  Dressings c/d/i  NVI in operative limb     RUE:  3 cm superficial laceration over thenar eminence with purulent drainage with 6 cm, erythematous area of fluctuance below laceration  TTP about fluctuant mass  Pain over area of fluctuance with thumb ROM  FROM shoulder, elbow and wrist  SILT M/U/R  Motor intact AIN/PIN/M/U/R   Cap refill < 2s  2+ RP      Significant Labs: CRP, ESR, Procalcitonin pending.  Significant Imaging: I have reviewed all pertinent imaging results/findings.  "

## 2020-07-18 NOTE — OP NOTE
Orthopaedic Surgery Operative Report      DATE OF PROCEDURE: 07/18/2020   PREOPERATIVE DIAGNOSIS: Laceration of right hand with infection [S61.411A, L08.9]  Foreign body, hand, superficial, right, initial encounter [S60.487M]  POSTOPERATIVE DIAGNOSIS: same as above  PROCEDURE PERFORMED:   -Foreign body removal right hand  - irrigation and excisional debridement right hand wound skin     SURGEON: Norman Reeves MD   ASSISTANT: Amber Alarcon MD- PGY-4  Jakob Savage MD- PGY-3  ANESTHESIA: general  ESTIMATED BLOOD LOSS: <1 cc.     INDICATIONS FOR PROCEDURE: The patient is an 30 y.o. male who  presented with laceration to right thenar eminence with 2 days of worsening swelling and purulent drainage after scraping his hand on a rock while kayaking. On XR foreign body identified deep to the wound. Decision made to proceed to the OR for removal of foreign body and I&D.  The risks, benefits and alternatives to surgery were discussed with the patient at great length.  These include bleeding, infection, vessel/nerve damage, pain, numbness, tingling, complex regional pain syndrome, need for further surgery, stiffness, DVT, PE, arthritis and death.  Patient states an understanding and wishes to proceed with surgery.   All questions were answered.  No guarantees were implied or stated.      PROCEDURE IN DETAIL: The patient was identified in the preoperative holding area and site was marked. The patient was wheeled into the Operating Room and general anesthesia was induced. The patient was placed into a supine position with the affected limb in the prime position on a hand table. The right upper extremity was placed in a nonsterile tourniquet. The affected limb was then prepped and draped in the usual sterile fashion. A timeout was taken to confirm the patient, site, surgery, surgeon and administration of preoperative antibiotics. All agreed and we proceeded.   The arm was elevated but not exsanguinated and right torniquet  elevated. Incision was made through the laceration approximately 2 centimeters. A small amount of purulence was expressed. A rock was identified just deep the subcutaneous tissue and removed. All surrounding debris carefully removed. Hemostats were used to bluntly dissect subcutaneously and assure complete decompression. The wound was thoroughly irrigated with normal saline. A 15 blade was used to sharply debride the the wound edges through full thickness skin layer. Wound was closed with 3-0 nylon sutures. A soft dressing of cast padding and ace applied to the R hand. The patient was extubated, awakened and taken to the post anesthesia care unit in stable condition.     PLAN FOR THE PATIENT: Patient is admitted for IV antibiotics. Will consult ID for final antibiotic recommendations.       Attg Note:  I was present for all key aspects of the case.    Norman Reeves MD

## 2020-07-18 NOTE — TELEPHONE ENCOUNTER
Reason for Disposition   No answer.  First attempt to contact caller.  Follow-up call scheduled within 15 minutes.    Protocols used: NO CONTACT OR DUPLICATE CONTACT CALL-A-AH    Unable to reach x 2 attempts. Left 2 voice messages. Advised to go to ED for immediate attention on voicemail.

## 2020-07-18 NOTE — NURSING
Accepted pt to room alert and oriented. Oriented pt to room and call light. Call light in reach . Side rails up x 2. Vs wnl and charted. Pt rating pain 4/10 does not want anything additional for pain. Right hand has cast padding and ace wrap. Dry and intact. Right hand Is elevated on pillow . Pt stating he is hungry diet ordered and waiting to be delivered to pt. Dietary notified. Will monitor pt.
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
## 2020-07-18 NOTE — SUBJECTIVE & OBJECTIVE
Past Medical History:   Diagnosis Date    Anxiety     Gilbert disease     Urticaria        Past Surgical History:   Procedure Laterality Date    plyoric      plyoricsynosis    ULNAR NERVE REPAIR      WISDOM TOOTH EXTRACTION         Review of patient's allergies indicates:  No Known Allergies    Current Facility-Administered Medications on File Prior to Encounter   Medication    [COMPLETED] clindamycin injection 600 mg     Current Outpatient Medications on File Prior to Encounter   Medication Sig    cetirizine (ZYRTEC) 10 MG tablet Take 10 mg by mouth once daily.    cholecalciferol, vitamin D3, 1,250 mcg (50,000 unit) capsule     doxycycline (MONODOX) 100 MG capsule Take 1 capsule (100 mg total) by mouth every 12 (twelve) hours. for 7 days    FLUARIX QUAD [id number], PF, 60 mcg (15 mcg x 4)/0.5 mL Syrg ADM 0.5ML IM UTD    mupirocin (BACTROBAN) 2 % ointment Apply topically 3 (three) times daily.    nitroglycerin (NITROGLYN) 0.2 % ointment Apply to anorectal area 2-3 times a day    triamcinolone acetonide 0.5% (KENALOG) 0.5 % Crea     triamcinolone acetonide 0.5% (KENALOG) 0.5 % Crea AISHA EXT AA BID    VITAMIN D2 1,250 mcg (50,000 unit) capsule TAKE 1 CAPSULE BY MOUTH  EVERY 7 DAYS (Patient not taking: Reported on 7/17/2020)     Family History     Problem Relation (Age of Onset)    Diabetes Father    Hypertension Brother    Sarcoidosis Mother        Tobacco Use    Smoking status: Never Smoker    Smokeless tobacco: Never Used   Substance and Sexual Activity    Alcohol use: No    Drug use: No    Sexual activity: Yes     Partners: Female     Review of Systems   Constitutional: Negative for chills and fever.   HENT: Negative for ear pain and sore throat.    Eyes: Negative for pain and visual disturbance.   Respiratory: Negative for cough and shortness of breath.    Cardiovascular: Negative for chest pain and palpitations.   Gastrointestinal: Negative for abdominal pain, diarrhea, nausea and vomiting.    Endocrine: Negative for heat intolerance and polydipsia.   Genitourinary: Negative for dysuria, frequency and urgency.   Musculoskeletal: Negative for back pain and gait problem.        + R hand pain and swelling   Skin: Negative for rash.   Neurological: Negative for dizziness and weakness.   Psychiatric/Behavioral: Negative for confusion. The patient is not nervous/anxious.      Objective:     Vital Signs (Most Recent):  Temp: 98.1 °F (36.7 °C) (07/18/20 1012)  Pulse: 100 (07/18/20 1012)  Resp: 14 (07/18/20 1012)  BP: 129/78 (07/18/20 1012)  SpO2: 98 % (07/18/20 1012) Vital Signs (24h Range):  Temp:  [98 °F (36.7 °C)-98.1 °F (36.7 °C)] 98.1 °F (36.7 °C)  Pulse:  [100-103] 100  Resp:  [14] 14  SpO2:  [98 %] 98 %  BP: (129-149)/(78-85) 129/78     Weight: 74.8 kg (165 lb)  Body mass index is 21.18 kg/m².    Physical Exam  Vitals signs and nursing note reviewed.   Constitutional:       Appearance: He is well-developed.      Comments: Healthy appearing male   HENT:      Head: Normocephalic and atraumatic.   Eyes:      Conjunctiva/sclera: Conjunctivae normal.      Pupils: Pupils are equal, round, and reactive to light.   Neck:      Musculoskeletal: Normal range of motion and neck supple.   Cardiovascular:      Rate and Rhythm: Normal rate and regular rhythm.      Heart sounds: Normal heart sounds.   Pulmonary:      Effort: Pulmonary effort is normal.      Breath sounds: Normal breath sounds.   Abdominal:      General: Bowel sounds are normal.      Palpations: Abdomen is soft.   Musculoskeletal: Normal range of motion.      Comments: R hand wrapped in bandage  R thumb ROM limited at MCP joint  Lymphatic tracking up to axilla   Skin:     General: Skin is warm and dry.   Neurological:      Mental Status: He is alert and oriented to person, place, and time.   Psychiatric:         Behavior: Behavior normal.           CRANIAL NERVES     CN III, IV, VI   Pupils are equal, round, and reactive to light.       Significant Labs:  CBC: No results for input(s): WBC, HGB, HCT, PLT in the last 48 hours.  CMP: No results for input(s): NA, K, CL, CO2, GLU, BUN, CREATININE, CALCIUM, PROT, ALBUMIN, BILITOT, ALKPHOS, AST, ALT, ANIONGAP, EGFRNONAA in the last 48 hours.    Invalid input(s): ESTGFAFR[place]    Significant Imaging: I have reviewed all pertinent imaging results/findings within the past 24 hours.     X-ray Hand 3 View Right    Result Date: 7/18/2020  EXAMINATION: XR HAND COMPLETE 3 VIEW RIGHT CLINICAL HISTORY: infection; TECHNIQUE: PA, lateral, and oblique views of the right hand were performed. COMPARISON: None FINDINGS: Bones are well mineralized.  Overall alignment is within normal limits.  No displaced fracture, dislocation or destructive osseous process.  Joint spaces appear maintained. There is a 7 x 4 mm irregular shaped radiodensity projected within the soft tissues along the palmar aspect of the base of the thumb at the level of the proximal metadiaphyseal junction.  On the lateral view this is approximately 8-9 mm deep to the skin surface, potentially within the hypothenar musculature.  No subcutaneous emphysema.     Base of the thumb 7 x 4 mm irregular shaped foreign body as above.     X-ray Finger 2 Or More Views Right    Result Date: 7/18/2020  EXAMINATION: XR FINGER 2 OR MORE VIEWS RIGHT CLINICAL HISTORY: thumb; TECHNIQUE: Two views right thumb COMPARISON: Non 07/18/2020 e FINDINGS: Opaque density foreign body adjacent to the thumb as before.  No fracture or bone destruction.  Needle overlying the soft tissues noted.     See above

## 2020-07-18 NOTE — ASSESSMENT & PLAN NOTE
Cellulitis and abscess of hand  FB found on plain films after patient presented with laceration while in [facility].  Drainage has been present and fluctuance noted prior to admission.    - ortho consulted and plan for I&D today in OR  - C/w doxy/levaquin to cover for vibrio, atypicals, and gram negative bacteria  - c/w clindamycin for MRSA coverage  - ID consulted and appreciate recs  - f/u BCx  - pain control
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
## 2020-07-18 NOTE — NURSING TRANSFER
Nursing Transfer Note      7/18/2020     Transfer To: 745    Transfer via stretcher    Transfer with 2 backpacks, bag with shoes, rock from surgery    Transported by rnx2    Medicines sent: none    Chart send with patient: Yes    Notified: grandmother    Patient reassessed at: 7/18/20
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
## 2020-07-18 NOTE — TRANSFER OF CARE
"Anesthesia Transfer of Care Note    Patient: Haroon Hernadez    Procedure(s) Performed: Procedure(s) (LRB):  Incision and Drainage Right hand with foreign body removal;  hand table; cysto tubing (Right)    Patient location: PACU    Anesthesia Type: general    Transport from OR: Transported from OR on 6-10 L/min O2 by face mask with adequate spontaneous ventilation    Post pain: adequate analgesia    Post assessment: no apparent anesthetic complications and tolerated procedure well    Post vital signs: stable    Level of consciousness: awake, alert and oriented    Nausea/Vomiting: no nausea/vomiting    Complications: none    Transfer of care protocol was followed      Last vitals:   Visit Vitals  /78 (BP Location: Left arm, Patient Position: Sitting)   Pulse 100   Temp 36.7 °C (98.1 °F) (Oral)   Resp 14   Ht 6' 2" (1.88 m)   Wt 74.8 kg (165 lb)   SpO2 98%   BMI 21.18 kg/m²     "

## 2020-07-19 LAB
ANION GAP SERPL CALC-SCNC: 10 MMOL/L (ref 8–16)
BASOPHILS # BLD AUTO: 0.02 K/UL (ref 0–0.2)
BASOPHILS NFR BLD: 0.2 % (ref 0–1.9)
BUN SERPL-MCNC: 9 MG/DL (ref 6–20)
CALCIUM SERPL-MCNC: 9.6 MG/DL (ref 8.7–10.5)
CHLORIDE SERPL-SCNC: 103 MMOL/L (ref 95–110)
CO2 SERPL-SCNC: 24 MMOL/L (ref 23–29)
CREAT SERPL-MCNC: 0.8 MG/DL (ref 0.5–1.4)
DIFFERENTIAL METHOD: ABNORMAL
EOSINOPHIL # BLD AUTO: 0 K/UL (ref 0–0.5)
EOSINOPHIL NFR BLD: 0 % (ref 0–8)
ERYTHROCYTE [DISTWIDTH] IN BLOOD BY AUTOMATED COUNT: 12.7 % (ref 11.5–14.5)
EST. GFR  (AFRICAN AMERICAN): >60 ML/MIN/1.73 M^2
EST. GFR  (NON AFRICAN AMERICAN): >60 ML/MIN/1.73 M^2
GLUCOSE SERPL-MCNC: 105 MG/DL (ref 70–110)
HCT VFR BLD AUTO: 43.6 % (ref 40–54)
HGB BLD-MCNC: 14.5 G/DL (ref 14–18)
IMM GRANULOCYTES # BLD AUTO: 0.04 K/UL (ref 0–0.04)
IMM GRANULOCYTES NFR BLD AUTO: 0.4 % (ref 0–0.5)
LYMPHOCYTES # BLD AUTO: 1.9 K/UL (ref 1–4.8)
LYMPHOCYTES NFR BLD: 17.6 % (ref 18–48)
MAGNESIUM SERPL-MCNC: 2 MG/DL (ref 1.6–2.6)
MCH RBC QN AUTO: 29.3 PG (ref 27–31)
MCHC RBC AUTO-ENTMCNC: 33.3 G/DL (ref 32–36)
MCV RBC AUTO: 88 FL (ref 82–98)
MONOCYTES # BLD AUTO: 0.5 K/UL (ref 0.3–1)
MONOCYTES NFR BLD: 4.8 % (ref 4–15)
NEUTROPHILS # BLD AUTO: 8.1 K/UL (ref 1.8–7.7)
NEUTROPHILS NFR BLD: 77 % (ref 38–73)
NRBC BLD-RTO: 0 /100 WBC
PHOSPHATE SERPL-MCNC: 3.9 MG/DL (ref 2.7–4.5)
PLATELET # BLD AUTO: 159 K/UL (ref 150–350)
PMV BLD AUTO: 11.9 FL (ref 9.2–12.9)
POTASSIUM SERPL-SCNC: 4 MMOL/L (ref 3.5–5.1)
RBC # BLD AUTO: 4.95 M/UL (ref 4.6–6.2)
SODIUM SERPL-SCNC: 137 MMOL/L (ref 136–145)
WBC # BLD AUTO: 10.49 K/UL (ref 3.9–12.7)

## 2020-07-19 PROCEDURE — 80048 BASIC METABOLIC PNL TOTAL CA: CPT

## 2020-07-19 PROCEDURE — 99214 PR OFFICE/OUTPT VISIT, EST, LEVL IV, 30-39 MIN: ICD-10-PCS | Mod: ,,, | Performed by: INTERNAL MEDICINE

## 2020-07-19 PROCEDURE — 83735 ASSAY OF MAGNESIUM: CPT

## 2020-07-19 PROCEDURE — 25000242 PHARM REV CODE 250 ALT 637 W/ HCPCS: Performed by: PHYSICIAN ASSISTANT

## 2020-07-19 PROCEDURE — 99226 PR SUBSEQUENT OBSERVATION CARE,LEVEL III: ICD-10-PCS | Mod: ,,, | Performed by: PHYSICIAN ASSISTANT

## 2020-07-19 PROCEDURE — 84100 ASSAY OF PHOSPHORUS: CPT

## 2020-07-19 PROCEDURE — 36415 COLL VENOUS BLD VENIPUNCTURE: CPT

## 2020-07-19 PROCEDURE — G0378 HOSPITAL OBSERVATION PER HR: HCPCS

## 2020-07-19 PROCEDURE — 25000003 PHARM REV CODE 250: Performed by: STUDENT IN AN ORGANIZED HEALTH CARE EDUCATION/TRAINING PROGRAM

## 2020-07-19 PROCEDURE — 99226 PR SUBSEQUENT OBSERVATION CARE,LEVEL III: CPT | Mod: ,,, | Performed by: PHYSICIAN ASSISTANT

## 2020-07-19 PROCEDURE — 63600175 PHARM REV CODE 636 W HCPCS: Performed by: STUDENT IN AN ORGANIZED HEALTH CARE EDUCATION/TRAINING PROGRAM

## 2020-07-19 PROCEDURE — 99214 OFFICE O/P EST MOD 30 MIN: CPT | Mod: ,,, | Performed by: INTERNAL MEDICINE

## 2020-07-19 PROCEDURE — 85025 COMPLETE CBC W/AUTO DIFF WBC: CPT

## 2020-07-19 PROCEDURE — 25000003 PHARM REV CODE 250: Performed by: PHYSICIAN ASSISTANT

## 2020-07-19 RX ADMIN — CLINDAMYCIN IN 5 PERCENT DEXTROSE 900 MG: 18 INJECTION, SOLUTION INTRAVENOUS at 08:07

## 2020-07-19 RX ADMIN — CLINDAMYCIN IN 5 PERCENT DEXTROSE 900 MG: 18 INJECTION, SOLUTION INTRAVENOUS at 01:07

## 2020-07-19 RX ADMIN — DOXYCYCLINE 100 MG: 100 INJECTION, POWDER, LYOPHILIZED, FOR SOLUTION INTRAVENOUS at 02:07

## 2020-07-19 RX ADMIN — DOXYCYCLINE 100 MG: 100 INJECTION, POWDER, LYOPHILIZED, FOR SOLUTION INTRAVENOUS at 01:07

## 2020-07-19 RX ADMIN — IBUPROFEN 800 MG: 200 TABLET, FILM COATED ORAL at 09:07

## 2020-07-19 RX ADMIN — OXYCODONE HYDROCHLORIDE 5 MG: 5 TABLET ORAL at 01:07

## 2020-07-19 RX ADMIN — VITAMIN D, TAB 1000IU (100/BT) 1000 UNITS: 25 TAB at 08:07

## 2020-07-19 RX ADMIN — IBUPROFEN 800 MG: 200 TABLET, FILM COATED ORAL at 01:07

## 2020-07-19 RX ADMIN — LEVOFLOXACIN 500 MG: 500 INJECTION, SOLUTION INTRAVENOUS at 03:07

## 2020-07-19 RX ADMIN — PSYLLIUM HUSK 1 PACKET: 3.4 POWDER ORAL at 12:07
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
## 2020-07-19 NOTE — SUBJECTIVE & OBJECTIVE
"Principal Problem:Foreign body of right hand    Principal Orthopedic Problem: Foreign body of right hand    Interval History: POD1 after foreign body removal and I&D of right hand after suffering an injury during kayaking. He tolerated the procedure well. ID consult placed for abx recs. He provides subjective improvement in his pain and function of finger flexion.     Review of patient's allergies indicates:  No Known Allergies    Current Facility-Administered Medications   Medication    albuterol-ipratropium 2.5 mg-0.5 mg/3 mL nebulizer solution 3 mL    clindamycin in D5W 900 mg/50 mL IVPB 900 mg    dextrose 50% injection 12.5 g    dextrose 50% injection 25 g    doxycycline (VIBRAMYCIN) 100 mg in dextrose 5 % 250 mL IVPB    glucagon (human recombinant) injection 1 mg    glucose chewable tablet 16 g    glucose chewable tablet 24 g    ibuprofen tablet 800 mg    levoFLOXacin 500 mg/100 mL IVPB 500 mg    melatonin tablet 6 mg    ondansetron disintegrating tablet 4 mg    oxyCODONE immediate release tablet 5 mg    promethazine (PHENERGAN) 6.25 mg in dextrose 5 % 50 mL IVPB    senna-docusate 8.6-50 mg per tablet 1 tablet    sodium chloride 0.9% flush 10 mL    sodium chloride 0.9% flush 10 mL    sodium chloride 0.9% flush 10 mL    vitamin D 1000 units tablet 1,000 Units     Objective:     Vital Signs (Most Recent):  Temp: 97.6 °F (36.4 °C) (07/19/20 0537)  Pulse: 73 (07/19/20 0537)  Resp: 16 (07/19/20 0537)  BP: 121/72 (07/19/20 0537)  SpO2: 98 % (07/19/20 0537) Vital Signs (24h Range):  Temp:  [96.4 °F (35.8 °C)-98.9 °F (37.2 °C)] 97.6 °F (36.4 °C)  Pulse:  [] 73  Resp:  [12-18] 16  SpO2:  [97 %-100 %] 98 %  BP: (117-164)/(72-90) 121/72     Weight: 74.8 kg (165 lb)  Height: 6' 2" (188 cm)  Body mass index is 21.18 kg/m².      Intake/Output Summary (Last 24 hours) at 7/19/2020 0701  Last data filed at 7/19/2020 0111  Gross per 24 hour   Intake 550 ml   Output 700 ml   Net -150 ml       Ortho/SPM " Exam     Vitals: Afebrile.  Vital signs stable.  General: No acute distress.  Cardio: Regular rate.  Chest: No increased work of breathing.    Right hand exam:    Minimal to moderate erythema at thenar eminence extending up volar aspect of forearm  3cm oblique incision over volar aspect of 1st metacarpal  No open wounds   Moderate swelling over thenar eminence  No thenar atrophy  No interossei atrophy  No deformity    No masses  Minimal TTP  No wrist effusion  Warm, well perfused    Fingers flex to proximal palmar crease  Thumb opposes to base of small finger  Minimal pain with active wrist flexion/extension    SILT and motor intact M/R/U  Radial pulse palpable  FPL intact  FDP/FDS intact to all fingers        Significant Labs:   BMP:   Recent Labs   Lab 07/19/20  0511         K 4.0      CO2 24   BUN 9   CREATININE 0.8   CALCIUM 9.6   MG 2.0     CBC:   Recent Labs   Lab 07/18/20  1154 07/19/20  0511   WBC 8.40 10.49   HGB 16.3 14.5   HCT 48.8 43.6    159       Significant Imaging: no new imaging

## 2020-07-19 NOTE — SUBJECTIVE & OBJECTIVE
Interval History: NAEON.  Patient reports ROM has improved as well as pain.  Patient denies any f/c/sweats.  He c/o insomnia last night d/t hospital setting (multiple overnight interruptions, sounds etc.).  Very astute and intellectually curious man, who treats his anxiety by becoming knowledgeable on a subject, which in this case is via [facility] and [facility] websites.     Review of Systems   Constitutional: Negative for chills and fever.   Respiratory: Negative for cough and shortness of breath.    Cardiovascular: Negative for chest pain and palpitations.   Gastrointestinal: Negative for abdominal pain, diarrhea, nausea and vomiting.   Genitourinary: Negative for dysuria, frequency and urgency.   Musculoskeletal:        R hand pain (resolved)   Skin: Positive for rash and wound.     Objective:     Vital Signs (Most Recent):  Temp: 98.9 °F (37.2 °C) (07/19/20 1202)  Pulse: 76 (07/19/20 1202)  Resp: 18 (07/19/20 1202)  BP: 135/79 (07/19/20 1202)  SpO2: 99 % (07/19/20 1202) Vital Signs (24h Range):  Temp:  [96.4 °F (35.8 °C)-98.9 °F (37.2 °C)] 98.9 °F (37.2 °C)  Pulse:  [72-98] 76  Resp:  [12-18] 18  SpO2:  [97 %-100 %] 99 %  BP: (117-164)/(69-90) 135/79     Weight: 74.8 kg (165 lb)  Body mass index is 21.18 kg/m².    Intake/Output Summary (Last 24 hours) at 7/19/2020 1426  Last data filed at 7/19/2020 0111  Gross per 24 hour   Intake 550 ml   Output 700 ml   Net -150 ml      Physical Exam  Vitals signs and nursing note reviewed.   Constitutional:       Appearance: He is well-developed.   HENT:      Head: Normocephalic and atraumatic.   Neck:      Musculoskeletal: Normal range of motion and neck supple.   Cardiovascular:      Rate and Rhythm: Normal rate and regular rhythm.      Heart sounds: Normal heart sounds.   Pulmonary:      Effort: Pulmonary effort is normal.      Breath sounds: Normal breath sounds.   Abdominal:      General: Bowel sounds are normal.      Palpations: Abdomen is soft.   Musculoskeletal:       Comments: R hand bandaged, able to move distal fingers  NVI, no gross sensation changes   Skin:     General: Skin is warm and dry.      Comments: Still with some lymphatic streaking proximally, up to R axilla.  No acute changes.    Neurological:      Mental Status: He is alert and oriented to person, place, and time.   Psychiatric:         Mood and Affect: Mood is anxious (mild; concerned for discharge and resolution of infection).         Behavior: Behavior normal.         Significant Labs:   CBC:   Recent Labs   Lab 07/18/20  1154 07/19/20  0511   WBC 8.40 10.49   HGB 16.3 14.5   HCT 48.8 43.6    159     CMP:   Recent Labs   Lab 07/18/20  1154 07/19/20  0511    137   K 4.2 4.0    103   CO2 27 24    105   BUN 7 9   CREATININE 0.9 0.8   CALCIUM 10.5 9.6   ANIONGAP 9 10   EGFRNONAA >60.0 >60.0       Significant Imaging: I have reviewed all pertinent imaging results/findings within the past 24 hours.

## 2020-07-19 NOTE — CONSULTS
[facility]  Infectious Disease  Consult Note    Patient Name: Haroon Hernadez  MRN: [id number]  Admission Date: 7/18/2020  Hospital Length of Stay: 0 days  Attending Physician: Greg Mccall MD  Primary Care Provider: Prakash Trammell MD     Isolation Status: No active isolations    Patient information was obtained from patient, past medical records and ER records.      Inpatient consult to Infectious Diseases  Consult performed by: Sy Weiss MD  Consult ordered by: Basilia Blackwell MD        Assessment/Plan:     Laceration of right hand with infection  30M with Gilbert syndrome presented with R hand pain, swelling, and erythema after cutting it on the [place] of [facility]. He initially had significant pain, decreased ROM of his R thumb, apparent lymphatic tracking, and fluctuation consistent with abscess formation. He is now s/p I/D and removal of a small rock that was deep in the wound near the musculature. Pain, swelling, erythema, and ROM are all now improving. Given decreased ROM at the R thumb, some concern for tendosynovitis. He never experienced systemic symptoms; he has been afebrile w/o leukocytosis. Currently on Doxycycline, Clindamycin, and Levofloxacin.    Recommendations:  - D/c Clindamycin  - Con't Doxycycline and Levofloxacin for at least 2 weeks  - Consider 4 week duration if Ortho also concerned for tendosynovitis  - F/u pending cultures and susceptibilities        Thank you for your consult. I will follow-up with patient. Please contact us if you have any additional questions.    Sy Weiss MD  Infectious Disease  [facility]    Subjective:     Principal Problem: Foreign body of right hand    HPI: Mr. Hernadez is a 29yo male with Gilbert syndrome and history of chikungunya who presents on 7/18 w/ R hand paint, swelling, and erythema. On 7/17 he was kayaking in [facility] and cut his hand on the cement bank after wading  through the water. The cut was on the palmar aspect of his R hand, just below the thumb. He initially rinsed out the wound with bottled water and placed steri-strips. He was seen at an urgent care where he was given an injection of Clindamycin and started on Doxycycline. Symptoms continued to worsen and he noticed red tracks spreading proximally up his arm to the antecubital fossa. He was admitted on 7/18 with a WBC of 8.4 and is now s/p I/D and foreign body removal. Samples were sent for culture. He was started on Clindamycin, Doxycycline, and Levofloxacin.     He denies any systemic symptoms such as fever/chills or n/v. He reports decreased movement of R thumb (now improving), but no other fingers. He denies any numbness. He reports significant pain that has now improved. He denies any recent out of country travel, but went to [place] a few weeks ago. He has not had any exotic animal exposures. He denies sick contacts.    Past Medical History:   Diagnosis Date    Anxiety     Gilbert disease     Urticaria        Past Surgical History:   Procedure Laterality Date    plyoric      plyoricsynosis    ULNAR NERVE REPAIR      WISDOM TOOTH EXTRACTION         Review of patient's allergies indicates:  No Known Allergies    Medications:  Facility-Administered Medications Prior to Admission   Medication    [COMPLETED] clindamycin injection 600 mg     Medications Prior to Admission   Medication Sig    cetirizine (ZYRTEC) 10 MG tablet Take 10 mg by mouth once daily.    cholecalciferol, vitamin D3, 1,250 mcg (50,000 unit) capsule     doxycycline (MONODOX) 100 MG capsule Take 1 capsule (100 mg total) by mouth every 12 (twelve) hours. for 7 days    FLUARIX QUAD 9668-2725, PF, 60 mcg (15 mcg x 4)/0.5 mL Syrg ADM 0.5ML IM UTD    mupirocin (BACTROBAN) 2 % ointment Apply topically 3 (three) times daily.    nitroglycerin (NITROGLYN) 0.2 % ointment Apply to anorectal area 2-3 times a day    triamcinolone acetonide  0.5% (KENALOG) 0.5 % Crea     triamcinolone acetonide 0.5% (KENALOG) 0.5 % Crea AISHA EXT AA BID    VITAMIN D2 1,250 mcg (50,000 unit) capsule TAKE 1 CAPSULE BY MOUTH  EVERY 7 DAYS (Patient not taking: Reported on 7/17/2020)     Antibiotics (From admission, onward)    Start     Stop Route Frequency Ordered    07/18/20 1330  clindamycin in D5W 900 mg/50 mL IVPB 900 mg      07/21 1644 IV Every 8 hours (non-standard times) 07/18/20 1330    07/18/20 1330  levoFLOXacin 500 mg/100 mL IVPB 500 mg      07/21 1344 IV Every 24 hours (non-standard times) 07/18/20 1330    07/18/20 1330  doxycycline (VIBRAMYCIN) 100 mg in dextrose 5 % 250 mL IVPB      07/21 1344 IV Every 12 hours (non-standard times) 07/18/20 1330        Antifungals (From admission, onward)    None        Antivirals (From admission, onward)    None           Immunization History   Administered Date(s) Administered    Influenza 11/22/2018    Influenza - Quadrivalent - PF (6 months and older) 11/15/2018, 10/08/2019    Td - PF (ADULT) 07/18/2020       Family History     Problem Relation (Age of Onset)    Diabetes Father    Hypertension Brother    Sarcoidosis Mother        Social History     Socioeconomic History    Marital status: Single     Spouse name: Not on file    Number of children: Not on file    Years of education: Not on file    Highest education level: Not on file   Occupational History    Not on file   Social Needs    Financial resource strain: Not on file    Food insecurity     Worry: Not on file     Inability: Not on file    Transportation needs     Medical: Not on file     Non-medical: Not on file   Tobacco Use    Smoking status: Never Smoker    Smokeless tobacco: Never Used   Substance and Sexual Activity    Alcohol use: No    Drug use: No    Sexual activity: Yes     Partners: Female   Lifestyle    Physical activity     Days per week: Not on file     Minutes per session: Not on file    Stress: Not on file   Relationships    Social  connections     Talks on phone: Not on file     Gets together: Not on file     Attends [employer] service: Not on file     Active member of club or organization: Not on file     Attends meetings of clubs or organizations: Not on file     Relationship status: Not on file   Other Topics Concern    Not on file   Social History Narrative    Not on file     Review of Systems   Constitutional: Negative for appetite change, chills and fever.   HENT: Negative for sore throat and trouble swallowing.    Respiratory: Negative for cough, chest tightness and shortness of breath.    Cardiovascular: Negative for chest pain.   Gastrointestinal: Negative for abdominal distention, abdominal pain, constipation, diarrhea, nausea and vomiting.   Musculoskeletal: Positive for joint swelling. Negative for arthralgias and myalgias.   Skin: Positive for color change.   Neurological: Negative for numbness and headaches.     Objective:     Vital Signs (Most Recent):  Temp: 98.5 °F (36.9 °C) (07/19/20 0752)  Pulse: 72 (07/19/20 0752)  Resp: 18 (07/19/20 0848)  BP: 119/69 (07/19/20 0752)  SpO2: 100 % (07/19/20 0752) Vital Signs (24h Range):  Temp:  [96.4 °F (35.8 °C)-98.9 °F (37.2 °C)] 98.5 °F (36.9 °C)  Pulse:  [72-98] 72  Resp:  [12-18] 18  SpO2:  [97 %-100 %] 100 %  BP: (117-164)/(69-90) 119/69     Weight: 74.8 kg (165 lb)  Body mass index is 21.18 kg/m².    Estimated Creatinine Clearance: 141.5 mL/min (based on SCr of 0.8 mg/dL).    Physical Exam  Vitals signs reviewed.   Constitutional:       General: He is not in acute distress.     Appearance: Normal appearance. He is normal weight. He is not ill-appearing.   HENT:      Mouth/Throat:      Mouth: Mucous membranes are moist.      Pharynx: Oropharynx is clear. No oropharyngeal exudate or posterior oropharyngeal erythema.   Eyes:      Conjunctiva/sclera: Conjunctivae normal.      Pupils: Pupils are equal, round, and reactive to light.   Neck:      Musculoskeletal: Neck supple. No muscular  tenderness.   Cardiovascular:      Rate and Rhythm: Normal rate and regular rhythm.      Heart sounds: Normal heart sounds. No murmur.   Pulmonary:      Effort: Pulmonary effort is normal. No respiratory distress.      Breath sounds: Normal breath sounds.   Abdominal:      General: Abdomen is flat. Bowel sounds are normal. There is no distension.      Palpations: Abdomen is soft. There is no mass.      Tenderness: There is no abdominal tenderness.   Musculoskeletal:         General: Signs of injury present.      Right wrist: He exhibits decreased range of motion, tenderness and swelling.      Comments: R wrist and forearm in bandage, decreased ROM of thumb   Lymphadenopathy:      Cervical: No cervical adenopathy.   Skin:     Findings: Erythema present.      Comments: Some erythematous tracking from R wrist to AC   Neurological:      General: No focal deficit present.      Mental Status: He is alert and oriented to person, place, and time.   Psychiatric:         Mood and Affect: Mood normal.         Behavior: Behavior normal.         Significant Labs:   Blood Culture:   Recent Labs   Lab 07/18/20  1154   LABBLOO No Growth to date  No Growth to date     CBC:   Recent Labs   Lab 07/18/20  1154 07/19/20  0511   WBC 8.40 10.49   HGB 16.3 14.5   HCT 48.8 43.6    159     CMP:   Recent Labs   Lab 07/18/20  1154 07/19/20  0511    137   K 4.2 4.0    103   CO2 27 24    105   BUN 7 9   CREATININE 0.9 0.8   CALCIUM 10.5 9.6   ANIONGAP 9 10   EGFRNONAA >60.0 >60.0     All pertinent labs within the past 24 hours have been reviewed.    Significant Imaging: I have reviewed all pertinent imaging results/findings within the past 24 hours.

## 2020-07-19 NOTE — DISCHARGE INSTRUCTIONS
ID clinic follow up on 7/30 at 11:30 am.   Ortho resident to set up your follow up apptment.  Follow ortho recs for care of the bandaged hand  Continue with the antibiotics for 4 weeks and f/u with Infectious disease. Cipro and doxycycline to cover for the aeromonas bacteria that grew from the culture as well as empirically for other water organisms.    You can take a probiotic to help prevent changes in your bowel movement.  They are over the counter and one that we use is called lactobacillus (culturelle).    Changes in your bowel movement are a common side effect and shouldn't stop you from taking your medications.  If it ends up becoming a problem or you develop fevers or chills concerning for an infection, stop the medication and contact the ID clinic (or your PCP) for further instruction.

## 2020-07-19 NOTE — PLAN OF CARE
POC reviewed with pt, verbalized an understanding; NADN; VSS; pt denies complaint at this time. No acute event/fall this shift. RUE elevated. Call light in reach, bed in lowest position. Safety precautions maintained.

## 2020-07-19 NOTE — SUBJECTIVE & OBJECTIVE
Past Medical History:   Diagnosis Date    Anxiety     Gilbert disease     Urticaria        Past Surgical History:   Procedure Laterality Date    plyoric      plyoricsynosis    ULNAR NERVE REPAIR      WISDOM TOOTH EXTRACTION         Review of patient's allergies indicates:  No Known Allergies    Medications:  Facility-Administered Medications Prior to Admission   Medication    [COMPLETED] clindamycin injection 600 mg     Medications Prior to Admission   Medication Sig    cetirizine (ZYRTEC) 10 MG tablet Take 10 mg by mouth once daily.    cholecalciferol, vitamin D3, 1,250 mcg (50,000 unit) capsule     doxycycline (MONODOX) 100 MG capsule Take 1 capsule (100 mg total) by mouth every 12 (twelve) hours. for 7 days    FLUARIX QUAD 2567-5052, PF, 60 mcg (15 mcg x 4)/0.5 mL Syrg ADM 0.5ML IM UTD    mupirocin (BACTROBAN) 2 % ointment Apply topically 3 (three) times daily.    nitroglycerin (NITROGLYN) 0.2 % ointment Apply to anorectal area 2-3 times a day    triamcinolone acetonide 0.5% (KENALOG) 0.5 % Crea     triamcinolone acetonide 0.5% (KENALOG) 0.5 % Crea AISHA EXT AA BID    VITAMIN D2 1,250 mcg (50,000 unit) capsule TAKE 1 CAPSULE BY MOUTH  EVERY 7 DAYS (Patient not taking: Reported on 7/17/2020)     Antibiotics (From admission, onward)    Start     Stop Route Frequency Ordered    07/18/20 1330  clindamycin in D5W 900 mg/50 mL IVPB 900 mg      07/21 1644 IV Every 8 hours (non-standard times) 07/18/20 1330    07/18/20 1330  levoFLOXacin 500 mg/100 mL IVPB 500 mg      07/21 1344 IV Every 24 hours (non-standard times) 07/18/20 1330    07/18/20 1330  doxycycline (VIBRAMYCIN) 100 mg in dextrose 5 % 250 mL IVPB      07/21 1344 IV Every 12 hours (non-standard times) 07/18/20 1330        Antifungals (From admission, onward)    None        Antivirals (From admission, onward)    None           Immunization History   Administered Date(s) Administered    Influenza 11/22/2018    Influenza - Quadrivalent - PF (6  months and older) 11/15/2018, 10/08/2019    Td - PF (ADULT) 07/18/2020       Family History     Problem Relation (Age of Onset)    Diabetes Father    Hypertension Brother    Sarcoidosis Mother        Social History     Socioeconomic History    Marital status: Single     Spouse name: Not on file    Number of children: Not on file    Years of education: Not on file    Highest education level: Not on file   Occupational History    Not on file   Social Needs    Financial resource strain: Not on file    Food insecurity     Worry: Not on file     Inability: Not on file    Transportation needs     Medical: Not on file     Non-medical: Not on file   Tobacco Use    Smoking status: Never Smoker    Smokeless tobacco: Never Used   Substance and Sexual Activity    Alcohol use: No    Drug use: No    Sexual activity: Yes     Partners: Female   Lifestyle    Physical activity     Days per week: Not on file     Minutes per session: Not on file    Stress: Not on file   Relationships    Social connections     Talks on phone: Not on file     Gets together: Not on file     Attends Voodoo service: Not on file     Active member of club or organization: Not on file     Attends meetings of clubs or organizations: Not on file     Relationship status: Not on file   Other Topics Concern    Not on file   Social History Narrative    Not on file     Review of Systems   Constitutional: Negative for appetite change, chills and fever.   HENT: Negative for sore throat and trouble swallowing.    Respiratory: Negative for cough, chest tightness and shortness of breath.    Cardiovascular: Negative for chest pain.   Gastrointestinal: Negative for abdominal distention, abdominal pain, constipation, diarrhea, nausea and vomiting.   Musculoskeletal: Positive for joint swelling. Negative for arthralgias and myalgias.   Skin: Positive for color change.   Neurological: Negative for numbness and headaches.     Objective:     Vital Signs  (Most Recent):  Temp: 98.5 °F (36.9 °C) (07/19/20 0752)  Pulse: 72 (07/19/20 0752)  Resp: 18 (07/19/20 0848)  BP: 119/69 (07/19/20 0752)  SpO2: 100 % (07/19/20 0752) Vital Signs (24h Range):  Temp:  [96.4 °F (35.8 °C)-98.9 °F (37.2 °C)] 98.5 °F (36.9 °C)  Pulse:  [72-98] 72  Resp:  [12-18] 18  SpO2:  [97 %-100 %] 100 %  BP: (117-164)/(69-90) 119/69     Weight: 74.8 kg (165 lb)  Body mass index is 21.18 kg/m².    Estimated Creatinine Clearance: 141.5 mL/min (based on SCr of 0.8 mg/dL).    Physical Exam  Vitals signs reviewed.   Constitutional:       General: He is not in acute distress.     Appearance: Normal appearance. He is normal weight. He is not ill-appearing.   HENT:      Mouth/Throat:      Mouth: Mucous membranes are moist.      Pharynx: Oropharynx is clear. No oropharyngeal exudate or posterior oropharyngeal erythema.   Eyes:      Conjunctiva/sclera: Conjunctivae normal.      Pupils: Pupils are equal, round, and reactive to light.   Neck:      Musculoskeletal: Neck supple. No muscular tenderness.   Cardiovascular:      Rate and Rhythm: Normal rate and regular rhythm.      Heart sounds: Normal heart sounds. No murmur.   Pulmonary:      Effort: Pulmonary effort is normal. No respiratory distress.      Breath sounds: Normal breath sounds.   Abdominal:      General: Abdomen is flat. Bowel sounds are normal. There is no distension.      Palpations: Abdomen is soft. There is no mass.      Tenderness: There is no abdominal tenderness.   Musculoskeletal:         General: Signs of injury present.      Right wrist: He exhibits decreased range of motion, tenderness and swelling.      Comments: R wrist and forearm in bandage, decreased ROM of thumb   Lymphadenopathy:      Cervical: No cervical adenopathy.   Skin:     Findings: Erythema present.      Comments: Some erythematous tracking from R wrist to AC   Neurological:      General: No focal deficit present.      Mental Status: He is alert and oriented to person,  place, and time.   Psychiatric:         Mood and Affect: Mood normal.         Behavior: Behavior normal.         Significant Labs:   Blood Culture:   Recent Labs   Lab 07/18/20  1154   LABBLOO No Growth to date  No Growth to date     CBC:   Recent Labs   Lab 07/18/20  1154 07/19/20  0511   WBC 8.40 10.49   HGB 16.3 14.5   HCT 48.8 43.6    159     CMP:   Recent Labs   Lab 07/18/20  1154 07/19/20  0511    137   K 4.2 4.0    103   CO2 27 24    105   BUN 7 9   CREATININE 0.9 0.8   CALCIUM 10.5 9.6   ANIONGAP 9 10   EGFRNONAA >60.0 >60.0     All pertinent labs within the past 24 hours have been reviewed.    Significant Imaging: I have reviewed all pertinent imaging results/findings within the past 24 hours.

## 2020-07-19 NOTE — ASSESSMENT & PLAN NOTE
30M with Gilbert syndrome presented with R hand pain, swelling, and erythema after cutting it on the cement [place] of [facility]. He initially had significant pain, decreased ROM of his R thumb, apparent lymphatic tracking, and fluctuation consistent with abscess formation. He is now s/p I/D and removal of a small rock that was deep in the wound near the musculature. Pain, swelling, erythema, and ROM are all now improving. Given decreased ROM at the R thumb, some concern for tendosynovitis. He never experienced systemic symptoms; he has been afebrile w/o leukocytosis. Currently on Doxycycline, Clindamycin, and Levofloxacin.    Recommendations:  - D/c Clindamycin  - Con't Doxycycline and Levofloxacin for at least 2 weeks  - Consider 4 week duration if Ortho also concerned for tendosynovitis  - F/u pending cultures and susceptibilities
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
## 2020-07-19 NOTE — HOSPITAL COURSE
Mr. Hernadez was admitted for cellulitis 2/2 thenar laceration complicated by foreign body.  Ortho was consulted and started on clinda/levaquin/doxy.  Patient underwent I&D with washout and FB removal on day of admission. No concerns for tenosynovitis.  ID consulted and recommended levaquin/doxy x14 days. Clinda dc'd and ID apptmt setup for 7/30.  Patient completed 24 hours of IV abx and improved his ROM.  Ortho to schedule f/u apptmt. Intra-op cultures growing aeromonas.  Patient c/o myalgias/weakness, but repeat infectious/metabolic workup unremarkable (see below).  Aerobic culture growing aeromonas.  Final ID recommendations are 4 weeks of cipro/doxy.  Patient agrees with plan and feels safe to discharge.

## 2020-07-19 NOTE — ASSESSMENT & PLAN NOTE
Cellulitis and abscess of hand  FB found on plain films after patient presented with laceration while in [facility].  Drainage has been present and fluctuance noted prior to admission.    - ortho consulted and plan for I&D today in OR  - C/w doxy/levaquin to cover for vibrio, atypicals, and gram negative bacteria  - c/w clindamycin for MRSA coverage  - ID consulted and appreciate recs]   - d/c clinda   - levaquin+ doxy x14 days  - f/u BCx (NGTD 7/19)  - pain control  - will need to follow up intra-op cultures for speciation and abx adjustments

## 2020-07-20 VITALS
BODY MASS INDEX: 21.17 KG/M2 | TEMPERATURE: 98 F | SYSTOLIC BLOOD PRESSURE: 133 MMHG | WEIGHT: 165 LBS | HEIGHT: 74 IN | OXYGEN SATURATION: 96 % | DIASTOLIC BLOOD PRESSURE: 80 MMHG | RESPIRATION RATE: 16 BRPM | HEART RATE: 86 BPM

## 2020-07-20 PROBLEM — F41.9 ANXIETY: Status: ACTIVE | Noted: 2020-07-20

## 2020-07-20 LAB
ANION GAP SERPL CALC-SCNC: 10 MMOL/L (ref 8–16)
BASOPHILS # BLD AUTO: 0.04 K/UL (ref 0–0.2)
BASOPHILS NFR BLD: 0.6 % (ref 0–1.9)
BUN SERPL-MCNC: 10 MG/DL (ref 6–20)
CALCIUM SERPL-MCNC: 10.4 MG/DL (ref 8.7–10.5)
CHLORIDE SERPL-SCNC: 103 MMOL/L (ref 95–110)
CO2 SERPL-SCNC: 25 MMOL/L (ref 23–29)
CREAT SERPL-MCNC: 0.9 MG/DL (ref 0.5–1.4)
DIFFERENTIAL METHOD: NORMAL
EOSINOPHIL # BLD AUTO: 0 K/UL (ref 0–0.5)
EOSINOPHIL NFR BLD: 0.4 % (ref 0–8)
ERYTHROCYTE [DISTWIDTH] IN BLOOD BY AUTOMATED COUNT: 12.8 % (ref 11.5–14.5)
EST. GFR  (AFRICAN AMERICAN): >60 ML/MIN/1.73 M^2
EST. GFR  (NON AFRICAN AMERICAN): >60 ML/MIN/1.73 M^2
GLUCOSE SERPL-MCNC: 99 MG/DL (ref 70–110)
HCT VFR BLD AUTO: 48.2 % (ref 40–54)
HGB BLD-MCNC: 15.8 G/DL (ref 14–18)
IMM GRANULOCYTES # BLD AUTO: 0.02 K/UL (ref 0–0.04)
IMM GRANULOCYTES NFR BLD AUTO: 0.3 % (ref 0–0.5)
LACTATE SERPL-SCNC: 1.7 MMOL/L (ref 0.5–2.2)
LYMPHOCYTES # BLD AUTO: 2.4 K/UL (ref 1–4.8)
LYMPHOCYTES NFR BLD: 33.1 % (ref 18–48)
MAGNESIUM SERPL-MCNC: 2 MG/DL (ref 1.6–2.6)
MCH RBC QN AUTO: 29.2 PG (ref 27–31)
MCHC RBC AUTO-ENTMCNC: 32.8 G/DL (ref 32–36)
MCV RBC AUTO: 89 FL (ref 82–98)
MONOCYTES # BLD AUTO: 0.5 K/UL (ref 0.3–1)
MONOCYTES NFR BLD: 6.9 % (ref 4–15)
NEUTROPHILS # BLD AUTO: 4.2 K/UL (ref 1.8–7.7)
NEUTROPHILS NFR BLD: 58.7 % (ref 38–73)
NRBC BLD-RTO: 0 /100 WBC
PHOSPHATE SERPL-MCNC: 3.7 MG/DL (ref 2.7–4.5)
PLATELET # BLD AUTO: 228 K/UL (ref 150–350)
PMV BLD AUTO: 10.7 FL (ref 9.2–12.9)
POTASSIUM SERPL-SCNC: 4.5 MMOL/L (ref 3.5–5.1)
RBC # BLD AUTO: 5.42 M/UL (ref 4.6–6.2)
SODIUM SERPL-SCNC: 138 MMOL/L (ref 136–145)
WBC # BLD AUTO: 7.11 K/UL (ref 3.9–12.7)

## 2020-07-20 PROCEDURE — 80048 BASIC METABOLIC PNL TOTAL CA: CPT

## 2020-07-20 PROCEDURE — 85025 COMPLETE CBC W/AUTO DIFF WBC: CPT

## 2020-07-20 PROCEDURE — 25000003 PHARM REV CODE 250: Performed by: PHYSICIAN ASSISTANT

## 2020-07-20 PROCEDURE — 83605 ASSAY OF LACTIC ACID: CPT

## 2020-07-20 PROCEDURE — 25000242 PHARM REV CODE 250 ALT 637 W/ HCPCS: Performed by: PHYSICIAN ASSISTANT

## 2020-07-20 PROCEDURE — 99217 PR OBSERVATION CARE DISCHARGE: CPT | Mod: ,,, | Performed by: PHYSICIAN ASSISTANT

## 2020-07-20 PROCEDURE — 99214 OFFICE O/P EST MOD 30 MIN: CPT | Mod: ,,, | Performed by: INTERNAL MEDICINE

## 2020-07-20 PROCEDURE — 84100 ASSAY OF PHOSPHORUS: CPT

## 2020-07-20 PROCEDURE — 99900035 HC TECH TIME PER 15 MIN (STAT)

## 2020-07-20 PROCEDURE — G0378 HOSPITAL OBSERVATION PER HR: HCPCS

## 2020-07-20 PROCEDURE — 36415 COLL VENOUS BLD VENIPUNCTURE: CPT

## 2020-07-20 PROCEDURE — 83735 ASSAY OF MAGNESIUM: CPT

## 2020-07-20 PROCEDURE — 25000003 PHARM REV CODE 250: Performed by: STUDENT IN AN ORGANIZED HEALTH CARE EDUCATION/TRAINING PROGRAM

## 2020-07-20 PROCEDURE — 99214 PR OFFICE/OUTPT VISIT, EST, LEVL IV, 30-39 MIN: ICD-10-PCS | Mod: ,,, | Performed by: INTERNAL MEDICINE

## 2020-07-20 PROCEDURE — 94761 N-INVAS EAR/PLS OXIMETRY MLT: CPT

## 2020-07-20 PROCEDURE — 99217 PR OBSERVATION CARE DISCHARGE: ICD-10-PCS | Mod: ,,, | Performed by: PHYSICIAN ASSISTANT

## 2020-07-20 RX ORDER — CIPROFLOXACIN 500 MG/1
500 TABLET ORAL EVERY 12 HOURS
Status: DISCONTINUED | OUTPATIENT
Start: 2020-07-20 | End: 2020-07-20 | Stop reason: HOSPADM

## 2020-07-20 RX ORDER — CIPROFLOXACIN 500 MG/1
500 TABLET ORAL 2 TIMES DAILY
Qty: 50 TABLET | Refills: 0 | Status: SHIPPED | OUTPATIENT
Start: 2020-07-20 | End: 2020-08-10

## 2020-07-20 RX ORDER — LEVOFLOXACIN 250 MG/1
500 TABLET ORAL DAILY
Status: DISCONTINUED | OUTPATIENT
Start: 2020-07-20 | End: 2020-07-20

## 2020-07-20 RX ORDER — DOXYCYCLINE 100 MG/1
100 CAPSULE ORAL 2 TIMES DAILY
Qty: 50 CAPSULE | Refills: 0 | Status: SHIPPED | OUTPATIENT
Start: 2020-07-20 | End: 2020-08-10

## 2020-07-20 RX ORDER — DOXYCYCLINE HYCLATE 100 MG
100 TABLET ORAL EVERY 12 HOURS
Status: DISCONTINUED | OUTPATIENT
Start: 2020-07-20 | End: 2020-07-20 | Stop reason: HOSPADM

## 2020-07-20 RX ORDER — CLOTRIMAZOLE AND BETAMETHASONE DIPROPIONATE 10; .5 MG/ML; MG/ML
LOTION TOPICAL 2 TIMES DAILY
Qty: 30 ML | Refills: 0 | Status: SHIPPED | OUTPATIENT
Start: 2020-07-20 | End: 2020-07-29

## 2020-07-20 RX ORDER — IBUPROFEN 400 MG/1
800 TABLET ORAL EVERY 8 HOURS PRN
Status: DISCONTINUED | OUTPATIENT
Start: 2020-07-20 | End: 2020-07-20 | Stop reason: HOSPADM

## 2020-07-20 RX ADMIN — ALPRAZOLAM 0.12 MG: 0.25 TABLET ORAL at 02:07

## 2020-07-20 RX ADMIN — PSYLLIUM HUSK 1 PACKET: 3.4 POWDER ORAL at 08:07

## 2020-07-20 RX ADMIN — DOXYCYCLINE 100 MG: 100 INJECTION, POWDER, LYOPHILIZED, FOR SOLUTION INTRAVENOUS at 02:07

## 2020-07-20 RX ADMIN — VITAMIN D, TAB 1000IU (100/BT) 1000 UNITS: 25 TAB at 08:07

## 2020-07-20 NOTE — NURSING
"Notified MD on call (Marcie) of pt c/o "severe shivering" and sore throat. Throat assessment WNL, VSS, no visible shivering at time of assessment. Room 67 degrees at time of shivering. Will continue monitoring pt condition, safety and comfort closely. No new orders noted.   "

## 2020-07-20 NOTE — NURSING
Pt given discharge instructions and follow up appointments to keep pt verbalized understnading of all. Pt medications have just been delivered to room. Pt requesting to ambulate out for discharge with his significant other .

## 2020-07-20 NOTE — PLAN OF CARE
CM met with patient at the bedside to discuss discharge planning assessment. Pt lives with significant other and has transportation at discharge. Pt verified PCP and Pharmacy. CM will continue to follow for discharge needs.         07/20/20 0953   Discharge Assessment   Assessment Type Discharge Planning Assessment   Confirmed/corrected address and phone number on facesheet? Yes   Assessment information obtained from? Patient   Expected Length of Stay (days) 2   Communicated expected length of stay with patient/caregiver yes   Prior to hospitilization cognitive status: Alert/Oriented   Prior to hospitalization functional status: Independent   Current cognitive status: Alert/Oriented   Current Functional Status: Independent   Lives With significant other   Able to Return to Prior Arrangements yes   Is patient able to care for self after discharge? Yes   Patient's perception of discharge disposition home or selfcare   Readmission Within the Last 30 Days no previous admission in last 30 days   Patient currently being followed by outpatient case management? No   Patient currently receives any other outside agency services? No   Equipment Currently Used at Home none   Do you have any problems affording any of your prescribed medications? No   Is the patient taking medications as prescribed? yes   Does the patient have transportation home? Yes   Transportation Anticipated family or friend will provide   Does the patient receive services at the Coumadin Clinic? No   Discharge Plan A Home with family   Discharge Plan B Home with family   DME Needed Upon Discharge  none   Patient/Family in Agreement with Plan yes
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
## 2020-07-20 NOTE — ASSESSMENT & PLAN NOTE
30M with Gilbert syndrome presented with R hand pain and concern for worsening cellulitis s/p I+D with removal of foreign object.  --wound culture 7/18: aerobic: aeromonas hydrophila; susceptibilities pending  --on day 3 levaquin and doxy        Recommendations:  - switch levaquin to cipro  - Con't Doxycycline and cipro x 4 weeks (estimated end date 8/15/20)  - plan for longer duration abx due to possible tendon involvement.  - f/u in ID clinic on 7/30/20

## 2020-07-20 NOTE — SUBJECTIVE & OBJECTIVE
Interval History: Pt reports chills overnight, but no fevers. Also complaining of fatigue, but looks forward to go going home. Abx tx and possible side effects discussed with patient.     Review of Systems   Constitutional: Negative for chills and fever.   HENT: Negative for congestion and sore throat.    Respiratory: Negative for cough and shortness of breath.    Cardiovascular: Negative for chest pain and leg swelling.   Gastrointestinal: Negative for abdominal pain and diarrhea.   Genitourinary: Negative for difficulty urinating and dysuria.   Musculoskeletal: Negative for arthralgias and back pain.   Neurological: Negative for dizziness and headaches.   Psychiatric/Behavioral: Negative for agitation and confusion.     Objective:     Vital Signs (Most Recent):  Temp: 97.8 °F (36.6 °C) (07/20/20 1454)  Pulse: 86 (07/20/20 1454)  Resp: 16 (07/20/20 1454)  BP: 133/80 (07/20/20 1454)  SpO2: 96 % (07/20/20 1454) Vital Signs (24h Range):  Temp:  [96.4 °F (35.8 °C)-99.1 °F (37.3 °C)] 97.8 °F (36.6 °C)  Pulse:  [65-86] 86  Resp:  [16-19] 16  SpO2:  [96 %-99 %] 96 %  BP: (127-147)/(67-86) 133/80     Weight: 74.8 kg (165 lb)  Body mass index is 21.18 kg/m².    Estimated Creatinine Clearance: 125.8 mL/min (based on SCr of 0.9 mg/dL).    Physical Exam  Vitals signs and nursing note reviewed.   Constitutional:       Appearance: Normal appearance.   HENT:      Head: Normocephalic and atraumatic.      Nose: Nose normal.      Mouth/Throat:      Mouth: Mucous membranes are moist.   Eyes:      Extraocular Movements: Extraocular movements intact.      Pupils: Pupils are equal, round, and reactive to light.   Neck:      Musculoskeletal: Normal range of motion and neck supple.   Cardiovascular:      Rate and Rhythm: Normal rate and regular rhythm.   Pulmonary:      Effort: Pulmonary effort is normal. No respiratory distress.      Breath sounds: Normal breath sounds.   Abdominal:      General: Abdomen is flat. Bowel sounds are normal.       Palpations: Abdomen is soft.   Musculoskeletal: Normal range of motion.      Comments: Rt hand in clean dressing   Skin:     General: Skin is warm and dry.   Neurological:      General: No focal deficit present.      Mental Status: He is alert and oriented to person, place, and time.   Psychiatric:         Mood and Affect: Mood normal.         Behavior: Behavior normal.         Significant Labs: All pertinent labs within the past 24 hours have been reviewed.    Significant Imaging: I have reviewed all pertinent imaging results/findings within the past 24 hours.

## 2020-07-20 NOTE — ASSESSMENT & PLAN NOTE
Cellulitis and abscess of hand  FB found on plain films after patient presented with laceration while in [facility].  Drainage has been present and fluctuance noted prior to admission.    - ortho consulted and plan for I&D today in OR  - C/w doxy/levaquin to cover for vibrio, atypicals, and gram negative bacteria  - c/w clindamycin for MRSA coverage  - ID consulted and appreciate recs   - d/c clinda   - levaquin+ doxy x14 days  - f/u BCx (NGTD 7/19)  - pain control  - Intra-op cultures growing aeromonas (aerobic culture)  - final ID recs above

## 2020-07-20 NOTE — ASSESSMENT & PLAN NOTE
Patient reports issues with insomnia in the past.  Ordered trazodone to help with sleep but patient reviewed previous testimonials he found online and believed that it would not be worth the possible side effects. Overnight of day 2/3 of admission, patient developed shivering/sore throat.  Xanax .125 mg was ordered as a back up choice for insomnia which gave him relief and slept well. He developed myalgias/weakness on day 3 of admission, but was improved with stretching and noted soreness on palpation. Elctrolytes, LA, CBC WNL and AFVSS.  Patient was advised supportive care.  Suspect sedentary status while admitted likely a contributing factor (he is normally very active), as well as his anxiety.

## 2020-07-20 NOTE — PROGRESS NOTES
[facility]  Infectious Disease  Progress Note    Patient Name: Haroon Hernadez  MRN: [id number]  Admission Date: 7/18/2020  Length of Stay: 0 days  Attending Physician: Greg Mccall MD  Primary Care Provider: Prakash Trammell MD    Isolation Status: No active isolations  Assessment/Plan:      Laceration of right hand with infection  30M with Gilbert syndrome presented with R hand pain and concern for worsening cellulitis s/p I+D with removal of foreign object.  --wound culture 7/18: aerobic: aeromonas hydrophila; susceptibilities pending  --on day 3 levaquin and doxy        Recommendations:  - switch levaquin to cipro  - Con't Doxycycline and cipro x 4 weeks (estimated end date 8/15/20)  - plan for longer duration abx due to possible tendon involvement.  - f/u in ID clinic on 7/30/20    Ring worm  --Lt leg lesion on medial aspect that is pruritic and circular   --recommend lotrisone ointment    Anticipated Disposition: TBD    Thank you for your consult. I will sign off. Please contact us if you have any additional questions.    Mesha Arroyo MD  Infectious Disease  [facility]    Subjective:     Principal Problem:Foreign body of right hand    HPI: Mr. Hernadez is a 29yo male with Gilbert syndrome and history of chikungunya who presents on 7/18 w/ R hand paint, swelling, and erythema. On 7/17 he was kayaking in [facility] and cut his hand on the cement bank after wading through the water. The cut was on the palmar aspect of his R hand, just below the thumb. He initially rinsed out the wound with bottled water and placed steri-strips. He was seen at an urgent care where he was given an injection of Clindamycin and started on Doxycycline. Symptoms continued to worsen and he noticed red tracks spreading proximally up his arm to the antecubital fossa. He was admitted on 7/18 with a WBC of 8.4 and is now s/p I/D and foreign body removal. Samples were sent  for culture. He was started on Clindamycin, Doxycycline, and Levofloxacin.     He denies any systemic symptoms such as fever/chills or n/v. He reports decreased movement of R thumb (now improving), but no other fingers. He denies any numbness. He reports significant pain that has now improved. He denies any recent out of country travel, but went to [place] state a few weeks ago. He has not had any exotic animal exposures. He denies sick contacts.  Interval History: Pt reports chills overnight, but no fevers. Also complaining of fatigue, but looks forward to go going home. Abx tx and possible side effects discussed with patient.     Review of Systems   Constitutional: Negative for chills and fever.   HENT: Negative for congestion and sore throat.    Respiratory: Negative for cough and shortness of breath.    Cardiovascular: Negative for chest pain and leg swelling.   Gastrointestinal: Negative for abdominal pain and diarrhea.   Genitourinary: Negative for difficulty urinating and dysuria.   Musculoskeletal: Negative for arthralgias and back pain.   Neurological: Negative for dizziness and headaches.   Psychiatric/Behavioral: Negative for agitation and confusion.     Objective:     Vital Signs (Most Recent):  Temp: 97.8 °F (36.6 °C) (07/20/20 1454)  Pulse: 86 (07/20/20 1454)  Resp: 16 (07/20/20 1454)  BP: 133/80 (07/20/20 1454)  SpO2: 96 % (07/20/20 1454) Vital Signs (24h Range):  Temp:  [96.4 °F (35.8 °C)-99.1 °F (37.3 °C)] 97.8 °F (36.6 °C)  Pulse:  [65-86] 86  Resp:  [16-19] 16  SpO2:  [96 %-99 %] 96 %  BP: (127-147)/(67-86) 133/80     Weight: 74.8 kg (165 lb)  Body mass index is 21.18 kg/m².    Estimated Creatinine Clearance: 125.8 mL/min (based on SCr of 0.9 mg/dL).    Physical Exam  Vitals signs and nursing note reviewed.   Constitutional:       Appearance: Normal appearance.   HENT:      Head: Normocephalic and atraumatic.      Nose: Nose normal.      Mouth/Throat:      Mouth: Mucous membranes are moist.    Eyes:      Extraocular Movements: Extraocular movements intact.      Pupils: Pupils are equal, round, and reactive to light.   Neck:      Musculoskeletal: Normal range of motion and neck supple.   Cardiovascular:      Rate and Rhythm: Normal rate and regular rhythm.   Pulmonary:      Effort: Pulmonary effort is normal. No respiratory distress.      Breath sounds: Normal breath sounds.   Abdominal:      General: Abdomen is flat. Bowel sounds are normal.      Palpations: Abdomen is soft.   Musculoskeletal: Normal range of motion.      Comments: Rt hand in clean dressing   Skin:     General: Skin is warm and dry.   Neurological:      General: No focal deficit present.      Mental Status: He is alert and oriented to person, place, and time.   Psychiatric:         Mood and Affect: Mood normal.         Behavior: Behavior normal.         Significant Labs: All pertinent labs within the past 24 hours have been reviewed.    Significant Imaging: I have reviewed all pertinent imaging results/findings within the past 24 hours.

## 2020-07-21 ENCOUNTER — TELEPHONE (OUTPATIENT)
Dept: ORTHOPEDICS | Facility: CLINIC | Age: 30
End: 2020-07-21

## 2020-07-21 LAB — BACTERIA SPEC AEROBE CULT: ABNORMAL

## 2020-07-21 NOTE — TELEPHONE ENCOUNTER
----- Message from Jazmin Kemp sent at 7/21/2020  9:18 AM CDT -----      Patient states had surgery the other day and did not receive any discharge instructions on how to care for his wound    Please contact patient to advise @# [phone]

## 2020-07-21 NOTE — TELEPHONE ENCOUNTER
Spoke with pt.   Advised, per Dr Reeves, pt is to wash the hand with soap and water, several times a day.   Pat dry and re-wrap with gauze and ace wrap

## 2020-07-21 NOTE — TELEPHONE ENCOUNTER
Spoke with pt.   Advised that I will double check with the MD regarding post op wound care instructions    Will contact pt once I speak with MD

## 2020-07-21 NOTE — TELEPHONE ENCOUNTER
----- Message from Ángel Gaines sent at 7/21/2020 10:19 AM CDT -----  Contact: self  Pt stated he missed a call from the office and is asking for a call back       Contact info- [phone]

## 2020-07-21 NOTE — PLAN OF CARE
07/21/20 0947   Final Note   Assessment Type Final Discharge Note   Anticipated Discharge Disposition Home   What phone number can be called within the next 1-3 days to see how you are doing after discharge? [phone]   Discharge plans and expectations educations in teach back method with documentation complete? Yes   Right Care Referral Info   Post Acute Recommendation No Care   Post-Acute Status   Post-Acute Authorization Other   Other Status No Post-Acute Service Needs

## 2020-07-23 LAB
BACTERIA BLD CULT: NORMAL
BACTERIA BLD CULT: NORMAL
BACTERIA SPEC ANAEROBE CULT: NORMAL

## 2020-07-24 ENCOUNTER — OFFICE VISIT (OUTPATIENT)
Dept: ORTHOPEDICS | Facility: CLINIC | Age: 30
End: 2020-07-24
Payer: COMMERCIAL

## 2020-07-24 ENCOUNTER — TELEPHONE (OUTPATIENT)
Dept: INFECTIOUS DISEASES | Facility: HOSPITAL | Age: 30
End: 2020-07-24

## 2020-07-24 VITALS
BODY MASS INDEX: 19.84 KG/M2 | WEIGHT: 154.56 LBS | HEIGHT: 74 IN | DIASTOLIC BLOOD PRESSURE: 81 MMHG | HEART RATE: 90 BPM | SYSTOLIC BLOOD PRESSURE: 130 MMHG

## 2020-07-24 DIAGNOSIS — L08.9 LACERATION OF RIGHT HAND WITH INFECTION, SUBSEQUENT ENCOUNTER: Primary | ICD-10-CM

## 2020-07-24 DIAGNOSIS — Z98.890 POST-OPERATIVE STATE: ICD-10-CM

## 2020-07-24 DIAGNOSIS — L03.119 CELLULITIS AND ABSCESS OF HAND: ICD-10-CM

## 2020-07-24 DIAGNOSIS — S60.551D FOREIGN BODY OF RIGHT HAND, SUBSEQUENT ENCOUNTER: ICD-10-CM

## 2020-07-24 DIAGNOSIS — L02.519 CELLULITIS AND ABSCESS OF HAND: ICD-10-CM

## 2020-07-24 DIAGNOSIS — S61.411D LACERATION OF RIGHT HAND WITH INFECTION, SUBSEQUENT ENCOUNTER: Primary | ICD-10-CM

## 2020-07-24 PROCEDURE — 99999 PR PBB SHADOW E&M-EST. PATIENT-LVL III: CPT | Mod: PBBFAC,,, | Performed by: NURSE PRACTITIONER

## 2020-07-24 PROCEDURE — 99999 PR PBB SHADOW E&M-EST. PATIENT-LVL III: ICD-10-PCS | Mod: PBBFAC,,, | Performed by: NURSE PRACTITIONER

## 2020-07-24 PROCEDURE — 99024 POSTOP FOLLOW-UP VISIT: CPT | Mod: S$GLB,,, | Performed by: NURSE PRACTITIONER

## 2020-07-24 PROCEDURE — 99024 PR POST-OP FOLLOW-UP VISIT: ICD-10-PCS | Mod: S$GLB,,, | Performed by: NURSE PRACTITIONER

## 2020-07-24 NOTE — TELEPHONE ENCOUNTER
Spoke to the patient over the phone.  He reports some achiness.  But overall doesn't feel symptoms are affecting his activities of daily living.  Advised him to call our clinic back on Monday and let us know if he feels things are getting worse.  He will stay on the antibiotics for now.

## 2020-07-24 NOTE — PROGRESS NOTES
Mr. Hernadez is here today for a post-operative visit after undergoing an I&D and removal of a foreign body to his right hand by Dr. Reeves on 7/18/2020.    Interval History:  He reports that he is doing ok.  Pain is minimal, he does endorse myalgias.  He is not taking pain medication.  He is still taking his antibiotics as directed at discharge from the hospital.  He is set to see ID next week on 7/30/2020.  He reports he has been cleaning his hand with antibacterial soap and running his incision under water to make sure it stays clean.  He denies fever, chills, and sweats since the time of the surgery.     Physical exam:  Dressing taken down.  Incision is clean, dry and intact.  There is no redness or drainage present.  It is too soon to remove his sutures therefore they were left in place.  He has + radial pulse, good ROM of all fingers and hand  of 4/5.        RADS: none done today    Assessment:  Post-op visit (1 weeks)    Plan:    ICD-10-CM ICD-9-CM   1. Laceration of right hand with infection, subsequent encounter  S61.411D V58.89    L08.9 882.1   2. Foreign body of right hand, subsequent encounter  S60.551D V58.89   3. Cellulitis and abscess of hand  L03.119 682.4    L02.519    4. Post-operative state  Z98.890 V45.89     Current care, treatment plan, precautions, activity level/ modifications, limitations, rehabilitation exercises and proposed future treatment were discussed with the patient. We discussed the need to monitor for changes in symptoms and condition and report them to the physician.  Discussed importance of compliance with all appointments and follow up examinations.       - Pain medication: refill was not needed,   - Pain medication refill policy provided to patient for review, yes   - Site covered with 2x2 and wrapped with ace wrap.  He has an allergy to band-aids.  - Patient is to return to clinic in 1 weeks  - At time x-ray of his right hand is not needed  - At time consider removal  of sutures.  - Cultures grew A. Hydrophilia sensitive to Doxycycline and Cipro, Gram stain, AFB and Fungus cultures are still pending.  - Per post-op note, it is recommended complete course of antibiotics as directed by ID.  - Advised not to get incision wet while sutures are in.       If there are any questions prior to scheduled follow up, the patient was instructed to contact the office

## 2020-07-24 NOTE — TELEPHONE ENCOUNTER
----- Message from Magda Walker MD sent at 7/24/2020 12:23 PM CDT -----  Contact: Haroon  tel:  [phone]    ----- Message -----  From: Ingrid Orlando MA  Sent: 7/24/2020  11:29 AM CDT  To: Magda Walker MD      ----- Message -----  From: Ca Hannah  Sent: 7/24/2020   8:25 AM CDT  To: [facility] Staff    Caller says he was told by you to call if he started have tendon problems.   Having this for 2 days now.  Does he need to come in today, pt. Is asking?    Also having stomach discomfort.     Pls call to discuss.   Pharmacy : [facility] [address].

## 2020-07-26 ENCOUNTER — NURSE TRIAGE (OUTPATIENT)
Dept: ADMINISTRATIVE | Facility: CLINIC | Age: 30
End: 2020-07-26

## 2020-07-26 NOTE — TELEPHONE ENCOUNTER
Reason for Disposition   [1] Red streak at IV site AND [2] longer than 1 inch (2.5 cm)    Additional Information   Negative: Severe difficulty breathing (e.g., struggling for each breath, speaks in single words)   Negative: Shock suspected (e.g., cold/pale/clammy skin, too weak to stand, low BP, rapid pulse)   Negative: Sounds like a life-threatening emergency to the triager   Negative: [1] Difficulty breathing AND [2] not severe   Negative: Arm is swollen, new onset (or leg swelling if IV in lower extremity)   Negative: Fever > 100.5 F (38.1 C)   Negative: Patient sounds very sick or weak to the triager   Negative: Pus or cloudy fluid from IV site   Negative: [1] Red streak at IV site AND [2] palpable cord    Protocols used: ST IV SITE (SKIN) [id number]    Pt stated he was discharged from the hospital a week ago. Stated today he noticed a 4 inch red line on his left arm right above where the IV was. Stated he has soreness and 4/10 pain in the arm. Per triage protocol, advised to see a Physician within 4 hrs for evaluation. Pt stated he's going to Urgent Care.

## 2020-07-27 ENCOUNTER — TELEPHONE (OUTPATIENT)
Dept: INFECTIOUS DISEASES | Facility: CLINIC | Age: 30
End: 2020-07-27

## 2020-07-27 DIAGNOSIS — B37.0 ORAL THRUSH: Primary | ICD-10-CM

## 2020-07-27 RX ORDER — FLUCONAZOLE 200 MG/1
200 TABLET ORAL DAILY
Qty: 7 TABLET | Refills: 0 | Status: SHIPPED | OUTPATIENT
Start: 2020-07-27 | End: 2020-08-03

## 2020-07-27 NOTE — TELEPHONE ENCOUNTER
Patient has been seen for this, and is following up with infectious diseases. Pt states he's doing alright and will let us know if he needs anything else.

## 2020-07-27 NOTE — TELEPHONE ENCOUNTER
Patient reports thrush.  Picture not very clear as to if he really has thrush.  Will send fluconazole to his pharmacy.

## 2020-07-29 ENCOUNTER — OFFICE VISIT (OUTPATIENT)
Dept: INTERNAL MEDICINE | Facility: CLINIC | Age: 30
End: 2020-07-29
Payer: COMMERCIAL

## 2020-07-29 DIAGNOSIS — E55.9 VITAMIN D DEFICIENCY: ICD-10-CM

## 2020-07-29 DIAGNOSIS — I80.9 PHLEBITIS: Primary | ICD-10-CM

## 2020-07-29 PROCEDURE — 99213 OFFICE O/P EST LOW 20 MIN: CPT | Mod: 95,,, | Performed by: INTERNAL MEDICINE

## 2020-07-29 PROCEDURE — 99213 PR OFFICE/OUTPT VISIT, EST, LEVL III, 20-29 MIN: ICD-10-PCS | Mod: 95,,, | Performed by: INTERNAL MEDICINE

## 2020-07-29 NOTE — Clinical Note
Please schedule lab at [facility] tomorrow--he already has ID appt at that time.

## 2020-07-29 NOTE — PROGRESS NOTES
Subjective:       Patient ID: Haroon Hernadez is a 30 y.o. male.    Chief Complaint: Wound Check    The patient location is: home  The chief complaint leading to consultation is: hospital f/u--hand infection    Visit type: audiovisual    Face to Face time with patient: 11mins  14 minutes of total time spent on the encounter, which includes face to face time and non-face to face time preparing to see the patient (eg, review of tests), Obtaining and/or reviewing separately obtained history, Documenting clinical information in the electronic or other health record, Independently interpreting results (not separately reported) and communicating results to the patient/family/caregiver, or Care coordination (not separately reported).     Each patient to whom he or she provides medical services by telemedicine is:  (1) informed of the relationship between the physician and patient and the respective role of any other health care provider with respect to management of the patient; and (2) notified that he or she may decline to receive medical services by telemedicine and may withdraw from such care at any time.    Notes: Pt opted for telemed visit due to covid-19        Pt here for f/u from recent hospitalization re: R hand cellulitis. Record reviewed. This started after kayaking in [facility] and cutting hand on broken glass. He required surgical I&D due to retained foreign body. He was placed on IV antibiotics while in hospitals; culture grew aeromonas so was switched to cipro and doxy x4 weeks which he is currently taking under direction of ID. He has f/u appt with ID tomorrow.     He c/o bruising and firmness along vein in L arm where IV was placed while in hospital. The IV was removed 11 days ago. He noticed firmness a week ago and bruising 4 days ago. No erythema. No swelling in arm. It starts at antecubital fossa to mid upper arm.      Review of Systems   Constitutional: Positive for activity change. Negative  for unexpected weight change.   HENT: Negative for hearing loss, rhinorrhea and trouble swallowing.    Eyes: Negative for discharge and visual disturbance.   Respiratory: Negative for chest tightness and wheezing.    Cardiovascular: Negative for chest pain and palpitations.   Gastrointestinal: Negative for blood in stool, constipation, diarrhea and vomiting.   Endocrine: Negative for polydipsia and polyuria.   Genitourinary: Negative for difficulty urinating, hematuria and urgency.   Musculoskeletal: Positive for arthralgias. Negative for joint swelling and neck pain.   Neurological: Negative for weakness and headaches.   Psychiatric/Behavioral: Negative for confusion and dysphoric mood.         Objective:      Physical Exam  Constitutional:       Appearance: He is well-developed.   Pulmonary:      Effort: Pulmonary effort is normal. No respiratory distress.   Musculoskeletal:      Comments: R hand with dressing; L arm with subtle raised linear tremayne that appears to be vein with bluish hue from antecubital fossa to mid upper arm but no apparent erythema and not tender when palpating; L arm does not appear to be edematous   Neurological:      Mental Status: He is alert and oriented to person, place, and time.   Psychiatric:         Behavior: Behavior normal.         Thought Content: Thought content normal.         Judgment: Judgment normal.         Assessment:       1. Phlebitis    2. Vitamin D deficiency        Plan:       1. No evidence for DVT so will have him use warm compresses and start asa 81mg daily  2. ED and RTC prompts d/w pt and he understood  3. He will see ID in person tomorrow so advised him to have arm examined then

## 2020-07-30 ENCOUNTER — OFFICE VISIT (OUTPATIENT)
Dept: INFECTIOUS DISEASES | Facility: CLINIC | Age: 30
End: 2020-07-30
Payer: COMMERCIAL

## 2020-07-30 VITALS
TEMPERATURE: 98 F | SYSTOLIC BLOOD PRESSURE: 127 MMHG | HEART RATE: 83 BPM | WEIGHT: 149.69 LBS | BODY MASS INDEX: 19.84 KG/M2 | DIASTOLIC BLOOD PRESSURE: 82 MMHG | HEIGHT: 73 IN

## 2020-07-30 DIAGNOSIS — B37.0 ORAL THRUSH: ICD-10-CM

## 2020-07-30 DIAGNOSIS — L02.519 CELLULITIS AND ABSCESS OF HAND: Primary | ICD-10-CM

## 2020-07-30 DIAGNOSIS — L03.119 CELLULITIS AND ABSCESS OF HAND: Primary | ICD-10-CM

## 2020-07-30 PROCEDURE — 99999 PR PBB SHADOW E&M-EST. PATIENT-LVL IV: CPT | Mod: PBBFAC,,, | Performed by: INTERNAL MEDICINE

## 2020-07-30 PROCEDURE — 99999 PR PBB SHADOW E&M-EST. PATIENT-LVL IV: ICD-10-PCS | Mod: PBBFAC,,, | Performed by: INTERNAL MEDICINE

## 2020-07-30 PROCEDURE — 99213 PR OFFICE/OUTPT VISIT, EST, LEVL III, 20-29 MIN: ICD-10-PCS | Mod: S$GLB,,, | Performed by: INTERNAL MEDICINE

## 2020-07-30 PROCEDURE — 99213 OFFICE O/P EST LOW 20 MIN: CPT | Mod: S$GLB,,, | Performed by: INTERNAL MEDICINE

## 2020-07-30 PROCEDURE — 3008F PR BODY MASS INDEX (BMI) DOCUMENTED: ICD-10-PCS | Mod: CPTII,S$GLB,, | Performed by: INTERNAL MEDICINE

## 2020-07-30 PROCEDURE — 3008F BODY MASS INDEX DOCD: CPT | Mod: CPTII,S$GLB,, | Performed by: INTERNAL MEDICINE

## 2020-07-30 NOTE — PROGRESS NOTES
Subjective:      Patient ID: Haroon Hernadez is a 30 y.o. male.    Chief Complaint:Follow-up      History of Present Illness    A 30-year-old man with Gilbert syndrome, past Chikungunya infection, and recent admission for a subcutaneous abscess with associated cellulitis due to retained foreign object after a laceration who is seen as a hospital follow up. He underwent I&D with removal of a stone. Initially there was concern for tendon involvement due to pain with flexion therefore he was discharged on a 4 week course of Cipro plus doxycycline. Operative culture became positive for Aeromonas post discharge. Since discharge he experienced oral thrush, which had happened in the past, as well as upper extremity soreness. He also developed some edema of the right hand yesterday after increased use but this has since resolved. He also has darker discoloration of the hand when compared to the left.       Review of Systems   Constitution: Positive for malaise/fatigue and weight loss. Negative for chills, decreased appetite, fever, night sweats and weight gain.   HENT: Negative for congestion, ear pain, hearing loss, hoarse voice, sore throat and tinnitus.    Eyes: Negative for blurred vision, redness and visual disturbance.   Cardiovascular: Negative for chest pain, leg swelling and palpitations.   Respiratory: Negative for cough, hemoptysis, shortness of breath and sputum production.    Hematologic/Lymphatic: Negative for adenopathy. Does not bruise/bleed easily.   Skin: Positive for color change. Negative for dry skin, itching, rash and suspicious lesions.   Musculoskeletal: Positive for joint pain. Negative for back pain, myalgias and neck pain.   Gastrointestinal: Negative for abdominal pain, constipation, diarrhea, heartburn, nausea and vomiting.   Genitourinary: Negative for dysuria, flank pain, frequency, hematuria, hesitancy and urgency.   Neurological: Negative for dizziness, headaches, numbness, paresthesias  and weakness.   Psychiatric/Behavioral: Negative for depression and memory loss. The patient does not have insomnia and is not nervous/anxious.      Objective:   Physical Exam  Vitals signs and nursing note reviewed.   Constitutional:       Appearance: He is well-developed.   HENT:      Head: Normocephalic and atraumatic.   Eyes:      General: No scleral icterus.        Right eye: No discharge.         Left eye: No discharge.      Conjunctiva/sclera: Conjunctivae normal.   Pulmonary:      Effort: Pulmonary effort is normal.   Musculoskeletal: Normal range of motion.   Skin:     General: Skin is warm and dry.   Neurological:      Mental Status: He is alert and oriented to person, place, and time.   Psychiatric:         Behavior: Behavior normal.         Thought Content: Thought content normal.         Judgment: Judgment normal.             Assessment:       No diagnosis found.      Plan:   Abscess and cellulitis due to Aeromonas hydrophilia. He underwent drainage and debridement. After discussion with Orthopedics and review of surgical notes it appears the infection did not go past the subcutaneous tissues. His thrush has now resolved.   · No further concern for tendon involvement.    · Stop doxycycline today  · Complete Cipro on Sunday night (2 weeks from surgical intervention)   · Will monitor AFB cultures and if positive for M marinum restart treatment.   · Patient will call if he develops signs and symptoms of infection.   · Would consider providing fluconazole if antibiotics are required in future to self treat for oral thrush should symptoms develop.   · RTC as needed.

## 2020-08-03 ENCOUNTER — OFFICE VISIT (OUTPATIENT)
Dept: ORTHOPEDICS | Facility: CLINIC | Age: 30
End: 2020-08-03
Payer: COMMERCIAL

## 2020-08-03 VITALS — BODY MASS INDEX: 20.54 KG/M2 | WEIGHT: 155 LBS | HEIGHT: 73 IN

## 2020-08-03 DIAGNOSIS — L02.519 CELLULITIS AND ABSCESS OF HAND: Primary | ICD-10-CM

## 2020-08-03 DIAGNOSIS — L03.119 CELLULITIS AND ABSCESS OF HAND: Primary | ICD-10-CM

## 2020-08-03 DIAGNOSIS — Z98.890 POST-OPERATIVE STATE: ICD-10-CM

## 2020-08-03 PROCEDURE — 99999 PR PBB SHADOW E&M-EST. PATIENT-LVL III: ICD-10-PCS | Mod: PBBFAC,,, | Performed by: NURSE PRACTITIONER

## 2020-08-03 PROCEDURE — 99024 PR POST-OP FOLLOW-UP VISIT: ICD-10-PCS | Mod: S$GLB,,, | Performed by: NURSE PRACTITIONER

## 2020-08-03 PROCEDURE — 99024 POSTOP FOLLOW-UP VISIT: CPT | Mod: S$GLB,,, | Performed by: NURSE PRACTITIONER

## 2020-08-03 PROCEDURE — 99999 PR PBB SHADOW E&M-EST. PATIENT-LVL III: CPT | Mod: PBBFAC,,, | Performed by: NURSE PRACTITIONER

## 2020-08-03 NOTE — PROGRESS NOTES
Mr. Hernadez is here today for a post-operative visit after undergoing an I&D and removal of a foreign body to his right hand by Dr. Reeves on 7/18/2020.    Interval History:  He reports that he is doing ok.  Pain is minimal and improving.  He has difficulty with flexion and extension of his right thumb.  He has completed his antibiotics, seen by ID on 7/30/2010.  He had an issue with oral thrush while on antibiotics.  His fungal cultures are still pending.  He denies fever, chills, and sweats since the time of the surgery.     Physical exam:  Dressing taken down.  Incision is clean, dry and intact.  There is no redness or drainage present.  Sutures removed without difficulty.  He has + radial pulse, good ROM of all fingers with exception of thumb as described above.  His hand  of 4/5.        RADS: none done today    Assessment:  Post-op visit (2 weeks)    Plan:    ICD-10-CM ICD-9-CM   1. Cellulitis and abscess of hand  L03.119 682.4    L02.519    2. Post-operative state  Z98.890 V45.89     Current care, treatment plan, precautions, activity level/ modifications, limitations, rehabilitation exercises and proposed future treatment were discussed with the patient. We discussed the need to monitor for changes in symptoms and condition and report them to the physician.  Discussed importance of compliance with all appointments and follow up examinations.       - Pain medication: refill was not needed,   - Pain medication refill policy provided to patient for review, yes   - Will refer to OT - [facility] for strengthening.  - Patient is to return to clinic in 6 weeks  - At time x-ray of his right hand is not needed  - Cultures grew A. Hydrophilia sensitive to Doxycycline and Cipro, Gram stain, AFB and Fungus cultures are still pending.  Seen by ID and completed course of antibiotics.    -Haroon was advised to keep the incision clean and dry for the next 24 hours after which he may wash the area with antibacterial soap  in the shower.   -He not submerge until the incision is completely healed  -Patient was advised to monitor wound closely and multiple times daily for any problems. Call clinic immediately or report to ED for immediate medical attention for any complications including reopening of wound, drainage, purulence, redness, streaking, odor, pain out of proportion, fever, chills, etc.          If there are any questions prior to scheduled follow up, the patient was instructed to contact the office

## 2020-08-04 ENCOUNTER — LAB VISIT (OUTPATIENT)
Dept: LAB | Facility: HOSPITAL | Age: 30
End: 2020-08-04
Attending: INTERNAL MEDICINE
Payer: COMMERCIAL

## 2020-08-04 DIAGNOSIS — E55.9 VITAMIN D DEFICIENCY: ICD-10-CM

## 2020-08-04 LAB — 25(OH)D3+25(OH)D2 SERPL-MCNC: 35 NG/ML (ref 30–96)

## 2020-08-04 PROCEDURE — 82306 VITAMIN D 25 HYDROXY: CPT

## 2020-08-04 PROCEDURE — 36415 COLL VENOUS BLD VENIPUNCTURE: CPT

## 2020-08-07 ENCOUNTER — CLINICAL SUPPORT (OUTPATIENT)
Dept: REHABILITATION | Facility: HOSPITAL | Age: 30
End: 2020-08-07
Payer: COMMERCIAL

## 2020-08-07 DIAGNOSIS — L02.519 CELLULITIS AND ABSCESS OF HAND: ICD-10-CM

## 2020-08-07 DIAGNOSIS — M25.60 DECREASED RANGE OF MOTION: ICD-10-CM

## 2020-08-07 DIAGNOSIS — L03.119 CELLULITIS AND ABSCESS OF HAND: ICD-10-CM

## 2020-08-07 DIAGNOSIS — R53.1 WEAKNESS: ICD-10-CM

## 2020-08-07 PROCEDURE — 97110 THERAPEUTIC EXERCISES: CPT | Mod: PO

## 2020-08-07 PROCEDURE — 97165 OT EVAL LOW COMPLEX 30 MIN: CPT | Mod: PO

## 2020-08-07 PROCEDURE — 97124 MASSAGE THERAPY: CPT | Mod: PO

## 2020-08-07 NOTE — PATIENT INSTRUCTIONS
"[facility]  OCCUPATIONAL THERAPY  HOME EXERCISE PROGRAM         Place pad of fingertip on scar area. Apply steady downward pressure while moving in circular fashion using Vaseline/Aquaphor . Use another fin-michael on top to assist. Repeat until entire scar has been covered.  Repeat for  5 minutes. Do 2  sessions per day.    - if you have a scab , place small amount of vaseline and bandaid ( the scab will gentle off to allow the tissue to heal faster)    BioCorneum has been shown to advance scar healing ( once scab is off) ;  can purchase on amazon         AROM: Wrist Extension        With right palm down, bend wrist up.  Repeat __15__ times per set.  Do __5__ sessions per day.      AROM: Thumb Abduction / Adduction        Actively pull right thumb away from palm as far as possible. Hold _3___ seconds. Then bring thumb back to touch fingers. Try not to bend fingers toward thumb.  Repeat __15__ times per set.  Do __5__ sessions per day.    Copyright © I. All rights reserved.   Opposition (Active)        Touch tip of thumb to nail tip of each finger in turn, making an "O" shape.  Repeat _15___ times. Do _5___ sessions per day.                  With palm on table, lift thumb up. Hold _3___ seconds. Relax and lower thumb.  Repeat ____ times. Do _5___ sessions per day.  15           Radial Adduction/Abduction (Active)        Move thumb out to side. Move back alongside index finger.  Repeat 15 times. Do 5 sessions per day.               "

## 2020-08-18 LAB — FUNGUS SPEC CULT: NORMAL

## 2020-08-18 NOTE — PROGRESS NOTES
Occupational Therapy Daily Treatment Note     Date: 8/19/2020  Name: Haroon Hernadez  Clinic Number: [id number]    Therapy Diagnosis:   Encounter Diagnoses   Name Primary?    Weakness     Decreased range of motion      Physician: Juan Avery NP    Medical Diagnosis: L03.119,L02.519 (ICD-10-CM) - Cellulitis and abscess of hand  Evaluation Date: 8/7/2020  Insurance Authorization Expiration date : 12-  Plan of Care Certification Period:   Date of Return to MD: [place] Reeves       Visit # / Visits authorized: 2 / 20  Time In: 3:45 PM  Time Out: 4:30 PM  Total Billable Time: 30 minutes     Precautions:  Standard and Fall    Subjective     Pt reports: Improved hand function.  Response to previous treatment:Ana Paula well    Pain: 0/10    Objective     Haroon received the following supervised modalities after being cleared for contraindications for 10  minutes:   -Paraffin w/ thumb wrapped in ext    Haroon performed therapeutic exercises for 20 minutes including:  -PROM into MP/CMC ext 10 count x 10  -PROM thumb MP/CMC ext 20  -VM to scar 5 min    Patient received ultrasound  for decreased inflammation @ 100 % duty cycle, 3,3 Mhz, applied to scar, intensity = 1.5 w/cm2 for 8 minutes.      Home Exercises and Education Provided     Education provided:   - Issued Dycem for scar massage  - Progress towards goals     Written Home Exercises Provided: Patient instructed to cont prior HEP.  Exercises were reviewed and Haroon was able to demonstrate them prior to the end of the session.  Haroon demonstrated good  understanding of the HEP provided.   .   See EMR under Patient Instructions for exercises provided prior visit.        Assessment     Pt would continue to benefit from skilled OT to maximize RUE function.    Haroon is progressing well towards his goals and there are no updates to goals at this time. Pt prognosis is Good.     Pt will continue to benefit from skilled outpatient occupational therapy to address the  deficits listed in the problem list on initial evaluation provide pt/family education and to maximize pt's level of independence in the home and community environment.       Pt's spiritual, cultural and educational needs considered and pt agreeable to plan of care and goals.    Goals:  Goals to be met in 6-8  weeks:     1) Patient to be IND with HEP and modalities for pain managment.  2) Patient will  Increase Active Range of motion 15-20 degrees in hand/wrist to increase functional hand use for ADLs/work/leisure activities.  3)Pt will increase  strength 10-20 lbs. to improve functional grasp for ADLs/work/leisure activities.   4) Patient will decrease complaints of pain to   out of 10 to increase functional hand use for ADL/work/leisure activities.   5) Pt will reduce edema by 0.2-0.3 inches to improve motion  6) Pt will increase pinch strength in all 3 limited positions by 1-3 psi to assist with manipulation and fine motor task       Plan   Cont OT to address above goals.        RADHA Alba OTR/L, CHT

## 2020-08-19 ENCOUNTER — CLINICAL SUPPORT (OUTPATIENT)
Dept: REHABILITATION | Facility: HOSPITAL | Age: 30
End: 2020-08-19
Payer: COMMERCIAL

## 2020-08-19 DIAGNOSIS — M25.60 DECREASED RANGE OF MOTION: ICD-10-CM

## 2020-08-19 DIAGNOSIS — R53.1 WEAKNESS: ICD-10-CM

## 2020-08-19 PROCEDURE — 97110 THERAPEUTIC EXERCISES: CPT | Mod: PO

## 2020-08-19 PROCEDURE — 97018 PARAFFIN BATH THERAPY: CPT | Mod: PO

## 2020-08-19 PROCEDURE — 97035 APP MDLTY 1+ULTRASOUND EA 15: CPT | Mod: PO

## 2020-09-19 LAB
ACID FAST MOD KINY STN SPEC: NORMAL
MYCOBACTERIUM SPEC QL CULT: NORMAL

## 2020-09-24 ENCOUNTER — PATIENT OUTREACH (OUTPATIENT)
Dept: ADMINISTRATIVE | Facility: OTHER | Age: 30
End: 2020-09-24

## 2020-09-25 ENCOUNTER — OFFICE VISIT (OUTPATIENT)
Dept: ALLERGY | Facility: CLINIC | Age: 30
End: 2020-09-25
Payer: COMMERCIAL

## 2020-09-25 DIAGNOSIS — E80.4 GILBERT DISEASE: ICD-10-CM

## 2020-09-25 DIAGNOSIS — L50.1 IDIOPATHIC URTICARIA: Primary | ICD-10-CM

## 2020-09-25 DIAGNOSIS — T78.3XXD ANGIOEDEMA, SUBSEQUENT ENCOUNTER: ICD-10-CM

## 2020-09-25 PROCEDURE — 99214 OFFICE O/P EST MOD 30 MIN: CPT | Mod: 95,,, | Performed by: ALLERGY & IMMUNOLOGY

## 2020-09-25 PROCEDURE — 99214 PR OFFICE/OUTPT VISIT, EST, LEVL IV, 30-39 MIN: ICD-10-PCS | Mod: 95,,, | Performed by: ALLERGY & IMMUNOLOGY

## 2020-09-25 RX ORDER — HYDROXYZINE HYDROCHLORIDE 25 MG/1
25 TABLET, FILM COATED ORAL 3 TIMES DAILY PRN
Qty: 25 TABLET | Refills: 2 | Status: SHIPPED | OUTPATIENT
Start: 2020-09-25 | End: 2021-01-27 | Stop reason: SDUPTHER

## 2020-09-25 NOTE — PROGRESS NOTES
The patient location is: [place]  The chief complaint leading to consultation is: urticaria and angioedema.    Visit type:  Audiovisual.    Face to Face time with patient: 25.  40 minutes of total time spent on the encounter, which includes face to face time and non-face to face time preparing to see the patient (eg, review of tests), Obtaining and/or reviewing separately obtained history, Documenting clinical information in the electronic or other health record, Independently interpreting results (not separately reported) and communicating results to the patient/family/caregiver, or Care coordination (not separately reported).     Each patient to whom he or she provides medical services by telemedicine is:  (1) informed of the relationship between the physician and patient and the respective role of any other health care provider with respect to management of the patient; and (2) notified that he or she may decline to receive medical services by telemedicine and may withdraw from such care at any time.    Haroon Hernadez is evaluated today via a video Virtual visit.  He was last seen May 30, 2019.    Since his last visit, he has done well with his urticaria and angioedema until recently.      Several weeks ago he started having increased lesions again.  They are red, raised, and pruritic.  They do not last longer than 24 hours before resolving.  There is no association with any food, contact, or ingestion.    He has not had any angioedema.    He has been under a great deal of stress in the last month.  His girlfriend broke up with him.  He is experiencing some anxiety and is speaking with a therapist.    He has also been having difficulty sleeping.    He has been taking Zyrtec one a day without control of his symptoms.    He has had a low vitamin-D in the past.    He was to get a previous chest x-ray to compare for his pectus excavatum.    [facility] ALLERGY QUESTIONNAIRE SHORT 9/22/2020   Head or facial pain: -    Facial swelling? No   Sinus pain? No   Sinus pressure? No   Ears: No symptoms   Hearing loss? -   Nose: -   Nosebleeds? No   Postnasal drip? No   Sneezing? No   Runny nose? No   Congestion? No   Throat: -   Sore throat? No   Trouble swallowing? No   Voice change? No   Eyes: -   Eye itching? No   Eye redness? No   Eye discharge? No   Eye pain?  No   Light sensitivity / light hurts the eyes? No   Lungs: -   Cough? No   Wheezing? No   Shortness of breath? No   Apnea? No   Choking? No   Chest tightness? No   Rash? Yes   Color change of skin? Yes       Physical Examination:  General: Well-developed, well-nourished, no acute distress.  Skin: Good turgor.  No angioedema.  There are multiple urticarial lesions across his arms, legs, and chest.  Neuro/Psych: Oriented x 3.    Pictures were reviewed on his cell phone on May 28, 2019 and are consistent with urticaria.    Laboratory 04/21/2015:  CBC:  Normal.  CMP:  Bilirubin total 1.4.  Lipid panel:  Cholesterol 154.  Hemoglobin A1c:  4.9.  HIV:  Negative.  RPR:  Nonreactive.  Vitamin-D Level:  26.    Laboratory 05/15/2019:  CBC:  Normal.  CMP:  Bilirubin total 1.9.  Lipid panel:  Cholesterol 177.  Hemoglobin A1c:  4.9.  TSH:  1.435.  RPR:  Nonreactive.  HIV:  Negative.  Hepatitis-C antibody:  Negative.  Hepatitis-B surface antigen:  Negative.    Laboratory 05/28/2019:  Thyroid peroxidase antibody level:  Less than 6.0.  Thyroglobulin antibody level:  Less than 4.0.  Serum tryptase:  3.0.  Vitamin-D Level:  26.  SPEP:  Normal.    Chest x-ray 05/28/2019:  Heart size normal.  There is small ill-defined opacity identified at the left lung base possible small area of infiltrate or a tiny nodule.  The patient previous chest x-ray would be helpful comparison.  If the another available CT scan would be helpful for further study.  Follow-up study recommended.  The lungs are otherwise clear.  No pleural effusion.    Assessment:  1.  Chronic recurrent urticaria with dermatographia,  probably idiopathic.  2.  Vitamin-D insufficiency.  3.  Gilbert's syndrome.  4.  Probable small lipoma.  5.  S/P pyloric stenosis surgery 1989.  6.  Pectus excavatum.  7.  Lactose intolerance.    Recommendations:  1.  Continue cetirizine 10 milligrams a day.  May take up to 40 milligrams a day if needed.  2.  Start hydroxyzine 25 to 50 milligrams at night.  This will help with his anxiety and sleep as well.  3.  Return to clinic in two weeks.  4.  Consider repeat vitamin-D level.    Patient education May 30, 2019:  Allergic mechanisms and treatment options were reviewed in detail.  Urticaria and angioedema were reviewed.

## 2020-11-30 ENCOUNTER — OFFICE VISIT (OUTPATIENT)
Dept: INTERNAL MEDICINE | Facility: CLINIC | Age: 30
End: 2020-11-30
Payer: COMMERCIAL

## 2020-11-30 DIAGNOSIS — B37.0 ORAL CANDIDIASIS: Primary | ICD-10-CM

## 2020-11-30 PROCEDURE — 99213 OFFICE O/P EST LOW 20 MIN: CPT | Mod: 95,,, | Performed by: INTERNAL MEDICINE

## 2020-11-30 PROCEDURE — 99213 PR OFFICE/OUTPT VISIT, EST, LEVL III, 20-29 MIN: ICD-10-PCS | Mod: 95,,, | Performed by: INTERNAL MEDICINE

## 2020-11-30 RX ORDER — FLUCONAZOLE 100 MG/1
TABLET ORAL
Qty: 8 TABLET | Refills: 0 | Status: SHIPPED | OUTPATIENT
Start: 2020-11-30 | End: 2021-01-27

## 2020-11-30 NOTE — PROGRESS NOTES
Subjective:       Patient ID: Haroon Hernadez is a 30 y.o. male.    Chief Complaint: Oral Pain    The patient location is: home  The chief complaint leading to consultation is: mouth pain    Visit type: audiovisual    Face to Face time with patient: 9mins  11 minutes of total time spent on the encounter, which includes face to face time and non-face to face time preparing to see the patient (eg, review of tests), Obtaining and/or reviewing separately obtained history, Documenting clinical information in the electronic or other health record, Independently interpreting results (not separately reported) and communicating results to the patient/family/caregiver, or Care coordination (not separately reported).     Each patient to whom he or she provides medical services by telemedicine is:  (1) informed of the relationship between the physician and patient and the respective role of any other health care provider with respect to management of the patient; and (2) notified that he or she may decline to receive medical services by telemedicine and may withdraw from such care at any time.    Pt c/o tingling in tongue and in roof of mouth along with some small sores on roof of mouth for the last 2 wks. He stopped taking hydroxyzine recently b/c he thought it was causing dry mouth. He had been taking hydroxyzine for angioedema/urticaria. No eye symptoms. He has had thrush before while on antibiotics 3 mos ago but went away with treatment. He says this feels similar but does not have white spots like he did at that time. He has not used anything for this. He doesn't feel he has dry mouth and symptoms have persisted even after stopping hydroxyzine.     Review of Systems   Constitutional: Negative for activity change, fever and unexpected weight change.   HENT: Negative for hearing loss, rhinorrhea, sore throat and trouble swallowing.    Eyes: Negative for pain, discharge, redness and visual disturbance.   Respiratory:  Negative for chest tightness and wheezing.    Cardiovascular: Negative for chest pain and palpitations.   Gastrointestinal: Negative for blood in stool, constipation, diarrhea and vomiting.   Endocrine: Negative for polydipsia and polyuria.   Genitourinary: Negative for difficulty urinating, hematuria and urgency.   Musculoskeletal: Negative for arthralgias, joint swelling and neck pain.   Neurological: Negative for weakness and headaches.   Psychiatric/Behavioral: Negative for confusion and dysphoric mood.         Objective:      Physical Exam  Constitutional:       Appearance: He is well-developed.   Pulmonary:      Effort: Pulmonary effort is normal. No respiratory distress.   Neurological:      Mental Status: He is alert and oriented to person, place, and time.   Psychiatric:         Behavior: Behavior normal.         Thought Content: Thought content normal.         Judgment: Judgment normal.         Assessment:       1. Oral candidiasis        Plan:       1. Diflucan  2. He will let me know if not improving during the next week and will setup in-person appt

## 2021-01-27 ENCOUNTER — OFFICE VISIT (OUTPATIENT)
Dept: INTERNAL MEDICINE | Facility: CLINIC | Age: 31
End: 2021-01-27
Payer: COMMERCIAL

## 2021-01-27 VITALS
OXYGEN SATURATION: 98 % | SYSTOLIC BLOOD PRESSURE: 112 MMHG | HEIGHT: 73 IN | BODY MASS INDEX: 20.49 KG/M2 | WEIGHT: 154.56 LBS | DIASTOLIC BLOOD PRESSURE: 78 MMHG | HEART RATE: 71 BPM

## 2021-01-27 DIAGNOSIS — Z00.00 WELLNESS EXAMINATION: Primary | ICD-10-CM

## 2021-01-27 DIAGNOSIS — Z11.3 ROUTINE SCREENING FOR STI (SEXUALLY TRANSMITTED INFECTION): ICD-10-CM

## 2021-01-27 PROBLEM — S60.551A FOREIGN BODY OF RIGHT HAND: Status: RESOLVED | Noted: 2020-07-18 | Resolved: 2021-01-27

## 2021-01-27 PROBLEM — L08.9 LACERATION OF RIGHT HAND WITH INFECTION: Status: RESOLVED | Noted: 2020-07-18 | Resolved: 2021-01-27

## 2021-01-27 PROBLEM — L03.119 CELLULITIS AND ABSCESS OF HAND: Status: RESOLVED | Noted: 2020-07-18 | Resolved: 2021-01-27

## 2021-01-27 PROBLEM — L02.519 CELLULITIS AND ABSCESS OF HAND: Status: RESOLVED | Noted: 2020-07-18 | Resolved: 2021-01-27

## 2021-01-27 PROBLEM — S61.411A LACERATION OF RIGHT HAND WITH INFECTION: Status: RESOLVED | Noted: 2020-07-18 | Resolved: 2021-01-27

## 2021-01-27 PROCEDURE — 1126F PR PAIN SEVERITY QUANTIFIED, NO PAIN PRESENT: ICD-10-PCS | Mod: S$GLB,,, | Performed by: INTERNAL MEDICINE

## 2021-01-27 PROCEDURE — 99999 PR PBB SHADOW E&M-EST. PATIENT-LVL III: ICD-10-PCS | Mod: PBBFAC,,, | Performed by: INTERNAL MEDICINE

## 2021-01-27 PROCEDURE — 1126F AMNT PAIN NOTED NONE PRSNT: CPT | Mod: S$GLB,,, | Performed by: INTERNAL MEDICINE

## 2021-01-27 PROCEDURE — 99395 PREV VISIT EST AGE 18-39: CPT | Mod: S$GLB,,, | Performed by: INTERNAL MEDICINE

## 2021-01-27 PROCEDURE — 3008F BODY MASS INDEX DOCD: CPT | Mod: CPTII,S$GLB,, | Performed by: INTERNAL MEDICINE

## 2021-01-27 PROCEDURE — 99999 PR PBB SHADOW E&M-EST. PATIENT-LVL III: CPT | Mod: PBBFAC,,, | Performed by: INTERNAL MEDICINE

## 2021-01-27 PROCEDURE — 99395 PR PREVENTIVE VISIT,EST,18-39: ICD-10-PCS | Mod: S$GLB,,, | Performed by: INTERNAL MEDICINE

## 2021-01-27 PROCEDURE — 3008F PR BODY MASS INDEX (BMI) DOCUMENTED: ICD-10-PCS | Mod: CPTII,S$GLB,, | Performed by: INTERNAL MEDICINE

## 2021-01-27 RX ORDER — HYDROXYZINE HYDROCHLORIDE 25 MG/1
25 TABLET, FILM COATED ORAL NIGHTLY PRN
Qty: 30 TABLET | Refills: 3 | Status: SHIPPED | OUTPATIENT
Start: 2021-01-27 | End: 2022-03-09

## 2021-01-30 ENCOUNTER — LAB VISIT (OUTPATIENT)
Dept: LAB | Facility: HOSPITAL | Age: 31
End: 2021-01-30
Attending: INTERNAL MEDICINE
Payer: COMMERCIAL

## 2021-01-30 DIAGNOSIS — Z11.3 ROUTINE SCREENING FOR STI (SEXUALLY TRANSMITTED INFECTION): ICD-10-CM

## 2021-01-30 DIAGNOSIS — Z00.00 WELLNESS EXAMINATION: ICD-10-CM

## 2021-01-30 LAB
ALBUMIN SERPL BCP-MCNC: 3.9 G/DL (ref 3.5–5.2)
ALP SERPL-CCNC: 55 U/L (ref 55–135)
ALT SERPL W/O P-5'-P-CCNC: 19 U/L (ref 10–44)
ANION GAP SERPL CALC-SCNC: 8 MMOL/L (ref 8–16)
AST SERPL-CCNC: 18 U/L (ref 10–40)
BASOPHILS # BLD AUTO: 0.05 K/UL (ref 0–0.2)
BASOPHILS NFR BLD: 0.8 % (ref 0–1.9)
BILIRUB SERPL-MCNC: 2.4 MG/DL (ref 0.1–1)
BUN SERPL-MCNC: 10 MG/DL (ref 6–20)
CALCIUM SERPL-MCNC: 9.3 MG/DL (ref 8.7–10.5)
CHLORIDE SERPL-SCNC: 104 MMOL/L (ref 95–110)
CHOLEST SERPL-MCNC: 161 MG/DL (ref 120–199)
CHOLEST/HDLC SERPL: 3.4 {RATIO} (ref 2–5)
CO2 SERPL-SCNC: 29 MMOL/L (ref 23–29)
CREAT SERPL-MCNC: 0.8 MG/DL (ref 0.5–1.4)
DIFFERENTIAL METHOD: NORMAL
EOSINOPHIL # BLD AUTO: 0.1 K/UL (ref 0–0.5)
EOSINOPHIL NFR BLD: 1.5 % (ref 0–8)
ERYTHROCYTE [DISTWIDTH] IN BLOOD BY AUTOMATED COUNT: 13 % (ref 11.5–14.5)
EST. GFR  (AFRICAN AMERICAN): >60 ML/MIN/1.73 M^2
EST. GFR  (NON AFRICAN AMERICAN): >60 ML/MIN/1.73 M^2
GLUCOSE SERPL-MCNC: 95 MG/DL (ref 70–110)
HCT VFR BLD AUTO: 49.4 % (ref 40–54)
HDLC SERPL-MCNC: 48 MG/DL (ref 40–75)
HDLC SERPL: 29.8 % (ref 20–50)
HGB BLD-MCNC: 16.1 G/DL (ref 14–18)
IMM GRANULOCYTES # BLD AUTO: 0.01 K/UL (ref 0–0.04)
IMM GRANULOCYTES NFR BLD AUTO: 0.2 % (ref 0–0.5)
LDLC SERPL CALC-MCNC: 102.2 MG/DL (ref 63–159)
LYMPHOCYTES # BLD AUTO: 2.3 K/UL (ref 1–4.8)
LYMPHOCYTES NFR BLD: 34.5 % (ref 18–48)
MCH RBC QN AUTO: 29.3 PG (ref 27–31)
MCHC RBC AUTO-ENTMCNC: 32.6 G/DL (ref 32–36)
MCV RBC AUTO: 90 FL (ref 82–98)
MONOCYTES # BLD AUTO: 0.3 K/UL (ref 0.3–1)
MONOCYTES NFR BLD: 5.1 % (ref 4–15)
NEUTROPHILS # BLD AUTO: 3.8 K/UL (ref 1.8–7.7)
NEUTROPHILS NFR BLD: 57.9 % (ref 38–73)
NONHDLC SERPL-MCNC: 113 MG/DL
NRBC BLD-RTO: 0 /100 WBC
PLATELET # BLD AUTO: 225 K/UL (ref 150–350)
PMV BLD AUTO: 10.7 FL (ref 9.2–12.9)
POTASSIUM SERPL-SCNC: 4.2 MMOL/L (ref 3.5–5.1)
PROT SERPL-MCNC: 6.3 G/DL (ref 6–8.4)
RBC # BLD AUTO: 5.5 M/UL (ref 4.6–6.2)
SODIUM SERPL-SCNC: 141 MMOL/L (ref 136–145)
TRIGL SERPL-MCNC: 54 MG/DL (ref 30–150)
WBC # BLD AUTO: 6.63 K/UL (ref 3.9–12.7)

## 2021-01-30 PROCEDURE — 80061 LIPID PANEL: CPT

## 2021-01-30 PROCEDURE — 85025 COMPLETE CBC W/AUTO DIFF WBC: CPT

## 2021-01-30 PROCEDURE — 36415 COLL VENOUS BLD VENIPUNCTURE: CPT

## 2021-01-30 PROCEDURE — 86703 HIV-1/HIV-2 1 RESULT ANTBDY: CPT

## 2021-01-30 PROCEDURE — 80053 COMPREHEN METABOLIC PANEL: CPT

## 2021-01-30 PROCEDURE — 86592 SYPHILIS TEST NON-TREP QUAL: CPT

## 2021-02-01 ENCOUNTER — PATIENT MESSAGE (OUTPATIENT)
Dept: INTERNAL MEDICINE | Facility: CLINIC | Age: 31
End: 2021-02-01

## 2021-02-01 DIAGNOSIS — E80.6 HYPERBILIRUBINEMIA: Primary | ICD-10-CM

## 2021-02-01 LAB
HIV 1+2 AB+HIV1 P24 AG SERPL QL IA: NEGATIVE
RPR SER QL: NORMAL

## 2021-03-22 ENCOUNTER — OFFICE VISIT (OUTPATIENT)
Dept: OPTOMETRY | Facility: CLINIC | Age: 31
End: 2021-03-22
Payer: COMMERCIAL

## 2021-03-22 DIAGNOSIS — Z46.0 FITTING AND ADJUSTMENT OF SPECTACLES AND CONTACT LENSES: Primary | ICD-10-CM

## 2021-03-22 DIAGNOSIS — H43.393 VISUAL FLOATERS, BILATERAL: ICD-10-CM

## 2021-03-22 DIAGNOSIS — Z97.3 WEARS CONTACT LENSES: ICD-10-CM

## 2021-03-22 DIAGNOSIS — H52.203 MYOPIA WITH ASTIGMATISM, BILATERAL: Primary | ICD-10-CM

## 2021-03-22 DIAGNOSIS — H40.013 OAG (OPEN ANGLE GLAUCOMA) SUSPECT, LOW RISK, BILATERAL: ICD-10-CM

## 2021-03-22 DIAGNOSIS — H10.89 GPC (GIANT PAPILLARY CONJUNCTIVITIS): ICD-10-CM

## 2021-03-22 DIAGNOSIS — H52.13 MYOPIA WITH ASTIGMATISM, BILATERAL: Primary | ICD-10-CM

## 2021-03-22 PROCEDURE — 99999 PR PBB SHADOW E&M-EST. PATIENT-LVL I: ICD-10-PCS | Mod: PBBFAC,,, | Performed by: OPTOMETRIST

## 2021-03-22 PROCEDURE — 92015 PR REFRACTION: ICD-10-PCS | Mod: S$GLB,,, | Performed by: OPTOMETRIST

## 2021-03-22 PROCEDURE — 92014 COMPRE OPH EXAM EST PT 1/>: CPT | Mod: S$GLB,,, | Performed by: OPTOMETRIST

## 2021-03-22 PROCEDURE — 99999 PR PBB SHADOW E&M-EST. PATIENT-LVL II: ICD-10-PCS | Mod: PBBFAC,,, | Performed by: OPTOMETRIST

## 2021-03-22 PROCEDURE — 99999 PR PBB SHADOW E&M-EST. PATIENT-LVL II: CPT | Mod: PBBFAC,,, | Performed by: OPTOMETRIST

## 2021-03-22 PROCEDURE — 92310 PR CONTACT LENS FITTING (NO CHANGE): ICD-10-PCS | Mod: CSM,,, | Performed by: OPTOMETRIST

## 2021-03-22 PROCEDURE — 92014 PR EYE EXAM, EST PATIENT,COMPREHESV: ICD-10-PCS | Mod: S$GLB,,, | Performed by: OPTOMETRIST

## 2021-03-22 PROCEDURE — 92015 DETERMINE REFRACTIVE STATE: CPT | Mod: S$GLB,,, | Performed by: OPTOMETRIST

## 2021-03-22 PROCEDURE — 99999 PR PBB SHADOW E&M-EST. PATIENT-LVL I: CPT | Mod: PBBFAC,,, | Performed by: OPTOMETRIST

## 2021-03-22 PROCEDURE — 92310 CONTACT LENS FITTING OU: CPT | Mod: CSM,,, | Performed by: OPTOMETRIST

## 2021-03-22 RX ORDER — NIFEDIPINE 30 MG/1
TABLET, EXTENDED RELEASE ORAL
COMMUNITY
Start: 2021-01-30 | End: 2022-03-09

## 2021-03-24 ENCOUNTER — TELEPHONE (OUTPATIENT)
Dept: OPTOMETRY | Facility: CLINIC | Age: 31
End: 2021-03-24

## 2021-05-17 ENCOUNTER — OFFICE VISIT (OUTPATIENT)
Dept: OPTOMETRY | Facility: CLINIC | Age: 31
End: 2021-05-17
Payer: COMMERCIAL

## 2021-05-17 DIAGNOSIS — Z97.3 WEARS CONTACT LENSES: ICD-10-CM

## 2021-05-17 DIAGNOSIS — H52.203 MYOPIA WITH ASTIGMATISM, BILATERAL: ICD-10-CM

## 2021-05-17 DIAGNOSIS — H52.13 MYOPIA WITH ASTIGMATISM, BILATERAL: ICD-10-CM

## 2021-05-17 DIAGNOSIS — H40.013 OAG (OPEN ANGLE GLAUCOMA) SUSPECT, LOW RISK, BILATERAL: Primary | ICD-10-CM

## 2021-05-17 PROCEDURE — 92133 CPTRZD OPH DX IMG PST SGM ON: CPT | Mod: S$GLB,,, | Performed by: OPTOMETRIST

## 2021-05-17 PROCEDURE — 76514 ECHO EXAM OF EYE THICKNESS: CPT | Mod: S$GLB,,, | Performed by: OPTOMETRIST

## 2021-05-17 PROCEDURE — 1126F AMNT PAIN NOTED NONE PRSNT: CPT | Mod: S$GLB,,, | Performed by: OPTOMETRIST

## 2021-05-17 PROCEDURE — 76514 PR  US, EYE, FOR CORNEAL THICKNESS: ICD-10-PCS | Mod: S$GLB,,, | Performed by: OPTOMETRIST

## 2021-05-17 PROCEDURE — 92012 PR EYE EXAM, EST PATIENT,INTERMED: ICD-10-PCS | Mod: S$GLB,,, | Performed by: OPTOMETRIST

## 2021-05-17 PROCEDURE — 92133 POSTERIOR SEGMENT OCT OPTIC NERVE(OCULAR COHERENCE TOMOGRAPHY) - OU - BOTH EYES: ICD-10-PCS | Mod: S$GLB,,, | Performed by: OPTOMETRIST

## 2021-05-17 PROCEDURE — 92083 EXTENDED VISUAL FIELD XM: CPT | Mod: S$GLB,,, | Performed by: OPTOMETRIST

## 2021-05-17 PROCEDURE — 92012 INTRM OPH EXAM EST PATIENT: CPT | Mod: S$GLB,,, | Performed by: OPTOMETRIST

## 2021-05-17 PROCEDURE — 92083 HUMPHREY VISUAL FIELD - OU - BOTH EYES: ICD-10-PCS | Mod: S$GLB,,, | Performed by: OPTOMETRIST

## 2021-05-17 PROCEDURE — 1126F PR PAIN SEVERITY QUANTIFIED, NO PAIN PRESENT: ICD-10-PCS | Mod: S$GLB,,, | Performed by: OPTOMETRIST

## 2021-05-17 PROCEDURE — 99999 PR PBB SHADOW E&M-EST. PATIENT-LVL II: ICD-10-PCS | Mod: PBBFAC,,, | Performed by: OPTOMETRIST

## 2021-05-17 PROCEDURE — 99999 PR PBB SHADOW E&M-EST. PATIENT-LVL II: CPT | Mod: PBBFAC,,, | Performed by: OPTOMETRIST

## 2021-05-17 RX ORDER — NIFEDIPINE 30 MG/1
TABLET, EXTENDED RELEASE ORAL
COMMUNITY
Start: 2021-04-12 | End: 2022-03-09

## 2021-05-20 ENCOUNTER — TELEPHONE (OUTPATIENT)
Dept: OPTOMETRY | Facility: CLINIC | Age: 31
End: 2021-05-20

## 2021-11-03 ENCOUNTER — TELEPHONE (OUTPATIENT)
Dept: ADMINISTRATIVE | Facility: OTHER | Age: 31
End: 2021-11-03
Payer: COMMERCIAL

## 2022-03-09 ENCOUNTER — LAB VISIT (OUTPATIENT)
Dept: LAB | Facility: OTHER | Age: 32
End: 2022-03-09
Attending: INTERNAL MEDICINE
Payer: COMMERCIAL

## 2022-03-09 ENCOUNTER — OFFICE VISIT (OUTPATIENT)
Dept: INTERNAL MEDICINE | Facility: CLINIC | Age: 32
End: 2022-03-09
Payer: COMMERCIAL

## 2022-03-09 VITALS
BODY MASS INDEX: 20.63 KG/M2 | DIASTOLIC BLOOD PRESSURE: 70 MMHG | SYSTOLIC BLOOD PRESSURE: 116 MMHG | HEIGHT: 73 IN | HEART RATE: 76 BPM | WEIGHT: 155.63 LBS | OXYGEN SATURATION: 99 %

## 2022-03-09 DIAGNOSIS — K21.9 GERD WITHOUT ESOPHAGITIS: Primary | ICD-10-CM

## 2022-03-09 DIAGNOSIS — E80.6 HYPERBILIRUBINEMIA: ICD-10-CM

## 2022-03-09 LAB
ALBUMIN SERPL BCP-MCNC: 4.4 G/DL (ref 3.5–5.2)
ALP SERPL-CCNC: 57 U/L (ref 55–135)
ALT SERPL W/O P-5'-P-CCNC: 12 U/L (ref 10–44)
AST SERPL-CCNC: 18 U/L (ref 10–40)
BILIRUB DIRECT SERPL-MCNC: 0.8 MG/DL (ref 0.1–0.3)
BILIRUB SERPL-MCNC: 2.4 MG/DL (ref 0.1–1)
PROT SERPL-MCNC: 7.2 G/DL (ref 6–8.4)

## 2022-03-09 PROCEDURE — 3008F PR BODY MASS INDEX (BMI) DOCUMENTED: ICD-10-PCS | Mod: CPTII,S$GLB,, | Performed by: INTERNAL MEDICINE

## 2022-03-09 PROCEDURE — 3078F PR MOST RECENT DIASTOLIC BLOOD PRESSURE < 80 MM HG: ICD-10-PCS | Mod: CPTII,S$GLB,, | Performed by: INTERNAL MEDICINE

## 2022-03-09 PROCEDURE — 3078F DIAST BP <80 MM HG: CPT | Mod: CPTII,S$GLB,, | Performed by: INTERNAL MEDICINE

## 2022-03-09 PROCEDURE — 1160F PR REVIEW ALL MEDS BY PRESCRIBER/CLIN PHARMACIST DOCUMENTED: ICD-10-PCS | Mod: CPTII,S$GLB,, | Performed by: INTERNAL MEDICINE

## 2022-03-09 PROCEDURE — 1159F PR MEDICATION LIST DOCUMENTED IN MEDICAL RECORD: ICD-10-PCS | Mod: CPTII,S$GLB,, | Performed by: INTERNAL MEDICINE

## 2022-03-09 PROCEDURE — 99213 PR OFFICE/OUTPT VISIT, EST, LEVL III, 20-29 MIN: ICD-10-PCS | Mod: S$GLB,,, | Performed by: INTERNAL MEDICINE

## 2022-03-09 PROCEDURE — 80076 HEPATIC FUNCTION PANEL: CPT | Performed by: INTERNAL MEDICINE

## 2022-03-09 PROCEDURE — 1159F MED LIST DOCD IN RCRD: CPT | Mod: CPTII,S$GLB,, | Performed by: INTERNAL MEDICINE

## 2022-03-09 PROCEDURE — 3008F BODY MASS INDEX DOCD: CPT | Mod: CPTII,S$GLB,, | Performed by: INTERNAL MEDICINE

## 2022-03-09 PROCEDURE — 99213 OFFICE O/P EST LOW 20 MIN: CPT | Mod: S$GLB,,, | Performed by: INTERNAL MEDICINE

## 2022-03-09 PROCEDURE — 3074F SYST BP LT 130 MM HG: CPT | Mod: CPTII,S$GLB,, | Performed by: INTERNAL MEDICINE

## 2022-03-09 PROCEDURE — 99999 PR PBB SHADOW E&M-EST. PATIENT-LVL III: CPT | Mod: PBBFAC,,, | Performed by: INTERNAL MEDICINE

## 2022-03-09 PROCEDURE — 3074F PR MOST RECENT SYSTOLIC BLOOD PRESSURE < 130 MM HG: ICD-10-PCS | Mod: CPTII,S$GLB,, | Performed by: INTERNAL MEDICINE

## 2022-03-09 PROCEDURE — 1160F RVW MEDS BY RX/DR IN RCRD: CPT | Mod: CPTII,S$GLB,, | Performed by: INTERNAL MEDICINE

## 2022-03-09 PROCEDURE — 36415 COLL VENOUS BLD VENIPUNCTURE: CPT | Performed by: INTERNAL MEDICINE

## 2022-03-09 PROCEDURE — 99999 PR PBB SHADOW E&M-EST. PATIENT-LVL III: ICD-10-PCS | Mod: PBBFAC,,, | Performed by: INTERNAL MEDICINE

## 2022-03-09 RX ORDER — PANTOPRAZOLE SODIUM 40 MG/1
40 TABLET, DELAYED RELEASE ORAL DAILY
Qty: 30 TABLET | Refills: 1 | Status: SHIPPED | OUTPATIENT
Start: 2022-03-09 | End: 2022-11-14

## 2022-03-09 NOTE — PROGRESS NOTES
Subjective:       Patient ID: Haroon Hernadez is a 32 y.o. male.    Chief Complaint: Gastroesophageal Reflux    Pt c/o GERD for several months. Using tums with some relief. No red flag symptoms. Spicy foods trigger this. Was having constipation but that has improved. No abd pain. No n/v. No dysphagia. No wt loss.     Review of Systems   Constitutional: Negative for activity change, fever and unexpected weight change.   HENT: Negative for hearing loss, rhinorrhea and trouble swallowing.    Eyes: Negative for discharge and visual disturbance.   Respiratory: Negative for chest tightness and wheezing.    Cardiovascular: Negative for chest pain and palpitations.   Gastrointestinal: Positive for constipation. Negative for abdominal pain, blood in stool, diarrhea and vomiting.   Endocrine: Negative for polydipsia and polyuria.   Genitourinary: Negative for difficulty urinating, hematuria and urgency.   Musculoskeletal: Negative for arthralgias, joint swelling and neck pain.   Neurological: Negative for weakness and headaches.   Psychiatric/Behavioral: Negative for confusion and dysphoric mood.         Objective:      Physical Exam  Constitutional:       Appearance: Normal appearance.   Cardiovascular:      Rate and Rhythm: Normal rate and regular rhythm.      Heart sounds: Normal heart sounds. No murmur heard.  Pulmonary:      Effort: Pulmonary effort is normal.      Breath sounds: Normal breath sounds.   Abdominal:      General: Bowel sounds are normal. There is no distension.      Palpations: Abdomen is soft. There is no mass.      Tenderness: There is no abdominal tenderness.   Neurological:      Mental Status: He is alert.   Psychiatric:         Mood and Affect: Mood normal.         Behavior: Behavior normal.      2   Assessment:       Problem List Items Addressed This Visit    None     Visit Diagnoses     GERD without esophagitis    -  Primary          Plan:       1. protonix daily x6-8 wks  2. Educated on  diet/lifestyle changes   3. RTC prompts d/w pt and he understood

## 2022-03-24 ENCOUNTER — PATIENT MESSAGE (OUTPATIENT)
Dept: INTERNAL MEDICINE | Facility: CLINIC | Age: 32
End: 2022-03-24
Payer: COMMERCIAL

## 2022-03-24 DIAGNOSIS — K64.9 HEMORRHOIDS, UNSPECIFIED HEMORRHOID TYPE: Primary | ICD-10-CM

## 2022-04-30 ENCOUNTER — PATIENT MESSAGE (OUTPATIENT)
Dept: INTERNAL MEDICINE | Facility: CLINIC | Age: 32
End: 2022-04-30
Payer: COMMERCIAL

## 2022-07-06 ENCOUNTER — PATIENT MESSAGE (OUTPATIENT)
Dept: INTERNAL MEDICINE | Facility: CLINIC | Age: 32
End: 2022-07-06
Payer: COMMERCIAL

## 2022-07-06 DIAGNOSIS — H92.03 EAR PAIN, BILATERAL: Primary | ICD-10-CM

## 2022-08-08 ENCOUNTER — TELEPHONE (OUTPATIENT)
Dept: OTOLARYNGOLOGY | Facility: CLINIC | Age: 32
End: 2022-08-08
Payer: COMMERCIAL

## 2022-08-08 NOTE — TELEPHONE ENCOUNTER
Returned call to patient and rescheduled visit for 9/9 at 3pm due to patient being unable to make it for 11:30am audiogram. Pt preferred Friday visits and confirmed visit. Was thanked for calling.  ----- Message from Tammy Diallo sent at 8/8/2022  4:28 PM CDT -----  Type:  Needs Medical Advice    Who Called: pt  Symptoms (please be specific):    How long has patient had these symptoms:    Pharmacy name and phone #:    Would the patient rather a call back or a response via [facility]?   Best Call Back Number: [phone] (H)   Additional Information: pt wants to speak to staff. He has appt at 1:00 .. audio was added for 11:30 and wants to know if he can come in after 12:00 ?
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
## 2022-09-09 ENCOUNTER — CLINICAL SUPPORT (OUTPATIENT)
Dept: OTOLARYNGOLOGY | Facility: CLINIC | Age: 32
End: 2022-09-09
Payer: COMMERCIAL

## 2022-09-09 ENCOUNTER — OFFICE VISIT (OUTPATIENT)
Dept: OTOLARYNGOLOGY | Facility: CLINIC | Age: 32
End: 2022-09-09
Payer: COMMERCIAL

## 2022-09-09 VITALS
SYSTOLIC BLOOD PRESSURE: 122 MMHG | BODY MASS INDEX: 21.1 KG/M2 | HEART RATE: 74 BPM | DIASTOLIC BLOOD PRESSURE: 79 MMHG | WEIGHT: 159.94 LBS

## 2022-09-09 DIAGNOSIS — J30.9 CHRONIC ALLERGIC RHINITIS: Primary | ICD-10-CM

## 2022-09-09 DIAGNOSIS — M26.622 ARTHRALGIA OF LEFT TEMPOROMANDIBULAR JOINT: ICD-10-CM

## 2022-09-09 DIAGNOSIS — H93.12 TINNITUS, LEFT: Primary | ICD-10-CM

## 2022-09-09 DIAGNOSIS — H93.13 BILATERAL TINNITUS: ICD-10-CM

## 2022-09-09 PROCEDURE — 92552 PURE TONE AUDIOMETRY AIR: CPT | Mod: S$GLB,,, | Performed by: PHYSICIAN ASSISTANT

## 2022-09-09 PROCEDURE — 92552 PR PURE TONE AUDIOMETRY, AIR: ICD-10-PCS | Mod: S$GLB,,, | Performed by: PHYSICIAN ASSISTANT

## 2022-09-09 PROCEDURE — 3074F SYST BP LT 130 MM HG: CPT | Mod: CPTII,S$GLB,, | Performed by: OTOLARYNGOLOGY

## 2022-09-09 PROCEDURE — 92556 PR SPEECH AUDIOMETRY, COMPLETE: ICD-10-PCS | Mod: S$GLB,,, | Performed by: PHYSICIAN ASSISTANT

## 2022-09-09 PROCEDURE — 1159F MED LIST DOCD IN RCRD: CPT | Mod: CPTII,S$GLB,, | Performed by: OTOLARYNGOLOGY

## 2022-09-09 PROCEDURE — 1159F PR MEDICATION LIST DOCUMENTED IN MEDICAL RECORD: ICD-10-PCS | Mod: CPTII,S$GLB,, | Performed by: OTOLARYNGOLOGY

## 2022-09-09 PROCEDURE — 1160F PR REVIEW ALL MEDS BY PRESCRIBER/CLIN PHARMACIST DOCUMENTED: ICD-10-PCS | Mod: CPTII,S$GLB,, | Performed by: OTOLARYNGOLOGY

## 2022-09-09 PROCEDURE — 92556 SPEECH AUDIOMETRY COMPLETE: CPT | Mod: S$GLB,,, | Performed by: PHYSICIAN ASSISTANT

## 2022-09-09 PROCEDURE — 99204 PR OFFICE/OUTPT VISIT, NEW, LEVL IV, 45-59 MIN: ICD-10-PCS | Mod: S$GLB,,, | Performed by: OTOLARYNGOLOGY

## 2022-09-09 PROCEDURE — 1160F RVW MEDS BY RX/DR IN RCRD: CPT | Mod: CPTII,S$GLB,, | Performed by: OTOLARYNGOLOGY

## 2022-09-09 PROCEDURE — 3078F PR MOST RECENT DIASTOLIC BLOOD PRESSURE < 80 MM HG: ICD-10-PCS | Mod: CPTII,S$GLB,, | Performed by: OTOLARYNGOLOGY

## 2022-09-09 PROCEDURE — 99204 OFFICE O/P NEW MOD 45 MIN: CPT | Mod: S$GLB,,, | Performed by: OTOLARYNGOLOGY

## 2022-09-09 PROCEDURE — 92567 PR TYMPA2METRY: ICD-10-PCS | Mod: S$GLB,,, | Performed by: PHYSICIAN ASSISTANT

## 2022-09-09 PROCEDURE — 99999 PR PBB SHADOW E&M-EST. PATIENT-LVL I: ICD-10-PCS | Mod: PBBFAC,,, | Performed by: PHYSICIAN ASSISTANT

## 2022-09-09 PROCEDURE — 3008F BODY MASS INDEX DOCD: CPT | Mod: CPTII,S$GLB,, | Performed by: OTOLARYNGOLOGY

## 2022-09-09 PROCEDURE — 3074F PR MOST RECENT SYSTOLIC BLOOD PRESSURE < 130 MM HG: ICD-10-PCS | Mod: CPTII,S$GLB,, | Performed by: OTOLARYNGOLOGY

## 2022-09-09 PROCEDURE — 92567 TYMPANOMETRY: CPT | Mod: S$GLB,,, | Performed by: PHYSICIAN ASSISTANT

## 2022-09-09 PROCEDURE — 3078F DIAST BP <80 MM HG: CPT | Mod: CPTII,S$GLB,, | Performed by: OTOLARYNGOLOGY

## 2022-09-09 PROCEDURE — 99999 PR PBB SHADOW E&M-EST. PATIENT-LVL III: CPT | Mod: PBBFAC,,, | Performed by: OTOLARYNGOLOGY

## 2022-09-09 PROCEDURE — 99999 PR PBB SHADOW E&M-EST. PATIENT-LVL III: ICD-10-PCS | Mod: PBBFAC,,, | Performed by: OTOLARYNGOLOGY

## 2022-09-09 PROCEDURE — 99999 PR PBB SHADOW E&M-EST. PATIENT-LVL I: CPT | Mod: PBBFAC,,, | Performed by: PHYSICIAN ASSISTANT

## 2022-09-09 PROCEDURE — 3008F PR BODY MASS INDEX (BMI) DOCUMENTED: ICD-10-PCS | Mod: CPTII,S$GLB,, | Performed by: OTOLARYNGOLOGY

## 2022-09-09 RX ORDER — NAPROXEN 500 MG/1
500 TABLET ORAL 2 TIMES DAILY WITH MEALS
Qty: 20 TABLET | Refills: 0 | Status: SHIPPED | OUTPATIENT
Start: 2022-09-09 | End: 2022-09-19

## 2022-09-09 RX ORDER — FLUTICASONE PROPIONATE 50 MCG
2 SPRAY, SUSPENSION (ML) NASAL DAILY
Qty: 9.9 ML | Refills: 11 | Status: SHIPPED | OUTPATIENT
Start: 2022-09-09 | End: 2022-11-15

## 2022-09-09 NOTE — PATIENT INSTRUCTIONS
WHAT IS TINNITUS?  The perception of sound heard in one or both sides of the head. Some refer to it as a ringing, noise, buzzing, roaring, clicking, wooshing, crickets, heartbeat, music, or other noises. There are varying levels of severity. The tinnitus may be constant or periodic. Common complaints include difficulty sleeping, struggling to understand other's speech, depression, and problems focusing.  Tinnitus is a symptom, not a disease. It affects 10-15% of adults in the [place].    WHAT CAN YOU DO?  You should seek medical care if you suspect you have tinnitus and tell your physician if your symptoms are affecting your daily life. Tinnitus may cause anxiety, depression, insomnia, negative emotions, and withdrawal. It's okay to seek help as your symptoms may be managed, and common.    HOW IS TINNITUS DIAGNOSED?  A doctor can diagnose tinnitus after a physical examination and interview. The examination may rule out conditions causing the tinnitus such as wax impaction or fluid behind the eardrum. Tinnitus may also be related to hearing loss, especially hearing loss from frequent noise exposure. A hearing test may be performed if you are experiencing tinnitus.    TREATMENT FOR TINNITUS?  Treatment may improve on its own but if it doesn't there are several ways to manage your symptoms although there is no cure. Possible treatment options include amplification (hearing aids), sound therapy (maskers,white noise,etc.), TMJ treatment, cognitive behavioral therapy, relaxation exercises, and managing your medications.  There is not enough evidence to suggest that over the counter products help patients with tinnitus. Acupuncture can neither be recommended or discouraged due to lack of evidence as well.    Source: American Academy of Otolaryngology-Head And Neck Surgery    ORGANIZATIONS AND LINKS FOR TINNITUS AND HEARING LOSS    American Academy of Audiology      www.audiology.org  American Tinnitus  Associate        www.shannon.org  American Academy of Otolaryngology, Head & Neck Surgery www.entnet.org  American Auditory Society     www.amauditorysoc.org  Better Hearing [place]      www.betterhearing.org  EarHelp        www.earhelp.co.uk/  Hearing Education and Awareness for [employer] (HEAR)  www.hearnet.[employer]  Hearing Loss Association of [place]    www.hearingloss.[employer]   Hear-It        www.hear-it.org  Healthy Hearing       www.healthyAutobook Now.[employer]   Sight and Hearing Association     www.sightandhearing.org

## 2022-09-09 NOTE — PROGRESS NOTES
Chief Complaint   Patient presents with    Tinnitus     Bilateral mostly left ear X 3 mos   .     HPI: Mani Bowie  is a very pleasant 32 y.o. male here to see me today for the first time for evaluation of tinnitus.  He reports tinnitus that has been gradually progressing over the last 2 month(s). He notes that it is primarily bilateral but worse on the left. He describes 2 months ago he had a sharp pain which he describes as a pop or twinge inside his ear. He saw primary care who told him he had clear fluid in his ear and advised zyrtec. He did feel that he had fluid and pressure sensation at the time. This seemed to be helped with zyrtec.  He also denies hearing loss. He has not noted any difference in hearing between the ears, with both ears being the better hearing ear.  He has had any recent issues with ear pain. No ear drainage.  He has not had any previous otologic surgery.  He denies any history of significant loud noise exposure. He does have jaw clicking when he eats certain foods. He is unsure if he grinds his teeth at night or clenches his jaw.         Past Medical History:   Diagnosis Date    Anxiety     Gilbert disease     Urticaria      Social History     Socioeconomic History    Marital status: Single   Tobacco Use    Smoking status: Never    Smokeless tobacco: Never   Substance and Sexual Activity    Alcohol use: No    Drug use: No    Sexual activity: Yes     Partners: Female     Social Determinants of Health     Financial Resource Strain: Unknown    Difficulty of Paying Living Expenses: Patient refused   Food Insecurity: Unknown    Worried About Running Out of Food in the Last Year: Patient refused    Ran Out of Food in the Last Year: Patient refused   Transportation Needs: Unknown    Lack of Transportation (Medical): Patient refused    Lack of Transportation (Non-Medical): Patient refused   Physical Activity: Insufficiently Active    Days of Exercise per Week: 3 days    Minutes of Exercise  per Session: 40 min   Stress: No Stress Concern Present    Feeling of Stress : Only a little   Social Connections: Unknown    Frequency of Communication with Friends and Family: More than three times a week    Frequency of Social Gatherings with Friends and Family: More than three times a week    Active Member of Clubs or Organizations: Yes    Attends Club or Organization Meetings: More than 4 times per year    Marital Status: Living with partner   Housing Stability: High Risk    Unable to Pay for Housing in the Last Year: No    Number of Places Lived in the Last Year: 1    Unstable Housing in the Last Year: Yes     Past Surgical History:   Procedure Laterality Date    INCISION AND DRAINAGE Right 7/18/2020    Procedure: Incision and Drainage Right hand with foreign body removal;  hand table; cysto tubing;  Surgeon: Norman Reeves MD;  Location: [facility] OR [address] [place] [address];  Service: Orthopedics;  Laterality: Right;    plyoric      plyoricsynosis    REMOVAL OF FOREIGN BODY FROM HAND  7/18/2020    Procedure: REMOVAL, FOREIGN BODY, HAND;  Surgeon: Norman Reeves MD;  Location: [facility] OR [address] [place] [address];  Service: Orthopedics;;    ULNAR NERVE REPAIR      WISDOM TOOTH EXTRACTION       Family History   Problem Relation Age of Onset    Sarcoidosis Mother     Diabetes Father     Hypertension Brother     Melanoma Neg Hx            Review of Systems  General: negative for chills, fever or weight loss  Psychological: negative for mood changes or depression  Ophthalmic: negative for blurry vision, photophobia or eye pain  ENT: see HPI  Respiratory: no cough, shortness of breath, or wheezing  Cardiovascular: no chest pain or dyspnea on exertion  Gastrointestinal: no abdominal pain, change in bowel habits, or black/ bloody stools  Musculoskeletal: negative for gait disturbance or muscular weakness  Neurological: no syncope or seizures; no ataxia  Dermatological: negative for puritis,  rash and jaundice  Hematologic/lymphatic: no easy bruising,  no new lumps or bumps      Physical Exam:    Vitals:    09/09/22 1532   BP: 122/79   Pulse: 74       Constitutional: Well appearing / communicating without difficutly.  NAD.  Eyes: EOM I Bilaterally  Head/Face: Normocephalic.  Negative paranasal sinus pressure/tenderness.  Salivary glands WNL.  House Brackmann I Bilaterally. + crepitus left TMJ    Right Ear: Auricle normal appearance. External Auditory Canal within normal limits no lesions or masses,TM w/o masses/lesions/perforations. TM mobility noted.   Left Ear: Auricle normal appearance. External Auditory Canal within normal limits no lesions or masses,TM w/o masses/lesions/perforations. TM mobility noted.  Nose: No gross nasal septal deviation. Inferior Turbinates 3+ bilaterally. No septal perforation. No masses/lesions. External nasal skin appears normal without masses/lesions.  Oral Cavity: Gingiva/lips within normal limits.  Dentition/gingiva healthy appearing. Mucus membranes moist. Floor of mouth soft, no masses palpated. Oral Tongue mobile. Hard Palate appears normal.    Oropharynx: Base of tongue appears normal. No masses/lesions noted. Tonsillar fossa/pharyngeal wall without lesions. Posterior oropharynx WNL.  Soft palate without masses. Midline uvula.   Neck/Lymphatic: No LAD I-VI bilaterally.  No thyromegaly.  No masses noted on exam.      Diagnostic Studies:  Audiogram reviewed personally by myself and in detail with the patient today. Results show normal hearing bilaterally; Type A tympanograms bilaterally.             Assessment:    ICD-10-CM ICD-9-CM    1. Chronic allergic rhinitis  J30.9 477.9 fluticasone propionate (FLONASE) 50 mcg/actuation nasal spray      2. Bilateral tinnitus  H93.13 388.30 Ambulatory referral/consult to ENT      3. Arthralgia of left temporomandibular joint  M26.622 524.62         The primary encounter diagnosis was Chronic allergic rhinitis. Diagnoses of Bilateral tinnitus and Arthralgia of left temporomandibular joint  were also pertinent to this visit.      Plan:  No orders of the defined types were placed in this encounter.      We reviewed his audiogram together in detail.  We also discussed that tinnitus is most often caused by a hearing loss, and that as the hair cells are damaged, either genetic or as a result of loud noise exposure, they then cause tinnitus.  Tinnitus tends to be louder in times of stress and fatigue, and may decrease with time.  Hearing aids are helpful if patient is a good hearing aid candidate. Sound machines may also be an effective masking technique if needed at night. I will give the patient a comprehensive guide on managing tinnitus today with reference materials to further learn about tinnitus and coping mechanisms.   Start Flonase 2 sprays per nostril daily  Continue zyrtec  We had a long discussion regarding the underlying pathology of temporomandibular joint dysfunction (TMD) as the cause of ear pain.  We further discussed conservative measures to treat TMD including avoiding gum and other foods that require lots of chewing, warm compresses, and scheduled antinflammatories. Prescription sent for naproxen 500mg PO BID WM.  If the pain persists, the patient will then schedule an appointment with a dentist for further evaluation.      Paulina Steiner MD

## 2022-09-09 NOTE — PROGRESS NOTES
Haroon Hernadez, a 32 y.o. male, was seen today in the clinic for an audiologic evaluation.  Patients main complaint was left sided tinnitus for about 2 months.      Audiogram results revealed normal hearing sensitivity bilaterally.  Speech reception thresholds were noted at 10 dB in the right ear and 10 dB in the left ear.  Speech discrimination scores were 100% in the right ear and 100% in the left ear.  Tympanometry revealed Type A in the right ear and Type A in the left ear.     Recommendations:  Otologic evaluation  Annual audiogram  Hearing protection when in noise

## 2022-11-14 NOTE — PROGRESS NOTES
Subjective:       Patient ID: Haroon Hernadez is a 32 y.o. male.    Chief Complaint: Fever (Symptoms began Wednesday. Two neg rapid covid tests. No flu or strep tests. ), Cough, and Sore Throat    Pt c/o 6 days of sore throat but no nasal congestion or rhinorrhea. Cough is mildly productive of white/yellow sputum. Started with fever but resolved 2 days ago. Sore throat. Not using anything for these symptoms. No sob/wheezing. Has not been vaccinated for flu but did get most recent covid booster. He also noticed urine has been more foamy than normal. No hematuria or abnormal urine color otherwise. He tested for covid x2 at home and neg.     Review of Systems   Constitutional:  Negative for fever.   HENT:  Positive for sore throat. Negative for nasal congestion and rhinorrhea.    Respiratory:  Positive for cough. Negative for shortness of breath.    Cardiovascular:  Negative for chest pain.       Objective:      Physical Exam  Constitutional:       Appearance: Normal appearance. He is well-developed.   HENT:      Right Ear: Tympanic membrane, ear canal and external ear normal.      Left Ear: Tympanic membrane, ear canal and external ear normal.      Nose: No mucosal edema or rhinorrhea.      Mouth/Throat:      Pharynx: No oropharyngeal exudate or posterior oropharyngeal erythema.   Eyes:      Extraocular Movements: Extraocular movements intact.      Pupils: Pupils are equal, round, and reactive to light.   Neck:      Thyroid: No thyromegaly.   Cardiovascular:      Rate and Rhythm: Normal rate and regular rhythm.      Heart sounds: Normal heart sounds.   Pulmonary:      Effort: Pulmonary effort is normal.      Breath sounds: Normal breath sounds.   Musculoskeletal:      Cervical back: Neck supple.      Right lower leg: No edema.      Left lower leg: No edema.   Lymphadenopathy:      Cervical: No cervical adenopathy.   Neurological:      Mental Status: He is alert and oriented to person, place, and time.    Psychiatric:         Mood and Affect: Mood normal.         Behavior: Behavior normal.       Assessment:       Problem List Items Addressed This Visit    None  Visit Diagnoses       Cough, unspecified type    -  Primary    Sore throat        Foamy urine        Relevant Orders    Urinalysis, Reflex to Urine Culture Urine, Clean Catch    Viral URI                Plan:       Flu swab and strep swab; outside tx window for flu and symptoms improving so advised otc tx--proper use d/w pt  Check UA  *flu and strep neg; suspect viral URI that is improving

## 2022-11-15 ENCOUNTER — OFFICE VISIT (OUTPATIENT)
Dept: INTERNAL MEDICINE | Facility: CLINIC | Age: 32
End: 2022-11-15
Payer: COMMERCIAL

## 2022-11-15 ENCOUNTER — LAB VISIT (OUTPATIENT)
Dept: LAB | Facility: OTHER | Age: 32
End: 2022-11-15
Attending: INTERNAL MEDICINE
Payer: COMMERCIAL

## 2022-11-15 VITALS
OXYGEN SATURATION: 99 % | WEIGHT: 161.81 LBS | DIASTOLIC BLOOD PRESSURE: 80 MMHG | BODY MASS INDEX: 21.45 KG/M2 | SYSTOLIC BLOOD PRESSURE: 122 MMHG | HEIGHT: 73 IN | HEART RATE: 88 BPM

## 2022-11-15 DIAGNOSIS — J06.9 VIRAL URI: ICD-10-CM

## 2022-11-15 DIAGNOSIS — R82.998 FOAMY URINE: ICD-10-CM

## 2022-11-15 DIAGNOSIS — R05.9 COUGH, UNSPECIFIED TYPE: Primary | ICD-10-CM

## 2022-11-15 DIAGNOSIS — J02.9 SORE THROAT: ICD-10-CM

## 2022-11-15 LAB
BILIRUB UR QL STRIP: NEGATIVE
CLARITY UR REFRACT.AUTO: CLEAR
COLOR UR AUTO: YELLOW
CTP QC/QA: YES
CTP QC/QA: YES
GLUCOSE UR QL STRIP: NEGATIVE
HGB UR QL STRIP: NEGATIVE
KETONES UR QL STRIP: NEGATIVE
LEUKOCYTE ESTERASE UR QL STRIP: NEGATIVE
MICROSCOPIC COMMENT: NORMAL
MOLECULAR STREP A: NEGATIVE
NITRITE UR QL STRIP: NEGATIVE
PH UR STRIP: 7 [PH] (ref 5–8)
POC MOLECULAR INFLUENZA A AGN: NEGATIVE
POC MOLECULAR INFLUENZA B AGN: NEGATIVE
PROT UR QL STRIP: NEGATIVE
SP GR UR STRIP: 1.01 (ref 1–1.03)
URN SPEC COLLECT METH UR: NORMAL
WBC #/AREA URNS AUTO: 1 /HPF (ref 0–5)

## 2022-11-15 PROCEDURE — 3074F PR MOST RECENT SYSTOLIC BLOOD PRESSURE < 130 MM HG: ICD-10-PCS | Mod: CPTII,S$GLB,, | Performed by: INTERNAL MEDICINE

## 2022-11-15 PROCEDURE — 99999 PR PBB SHADOW E&M-EST. PATIENT-LVL III: CPT | Mod: PBBFAC,,, | Performed by: INTERNAL MEDICINE

## 2022-11-15 PROCEDURE — 99999 PR PBB SHADOW E&M-EST. PATIENT-LVL III: ICD-10-PCS | Mod: PBBFAC,,, | Performed by: INTERNAL MEDICINE

## 2022-11-15 PROCEDURE — 87651 POCT STREP A MOLECULAR: ICD-10-PCS | Mod: QW,S$GLB,, | Performed by: INTERNAL MEDICINE

## 2022-11-15 PROCEDURE — 3079F DIAST BP 80-89 MM HG: CPT | Mod: CPTII,S$GLB,, | Performed by: INTERNAL MEDICINE

## 2022-11-15 PROCEDURE — 87502 POCT INFLUENZA A/B MOLECULAR: ICD-10-PCS | Mod: QW,S$GLB,, | Performed by: INTERNAL MEDICINE

## 2022-11-15 PROCEDURE — 1160F RVW MEDS BY RX/DR IN RCRD: CPT | Mod: CPTII,S$GLB,, | Performed by: INTERNAL MEDICINE

## 2022-11-15 PROCEDURE — 99213 OFFICE O/P EST LOW 20 MIN: CPT | Mod: S$GLB,,, | Performed by: INTERNAL MEDICINE

## 2022-11-15 PROCEDURE — 87651 STREP A DNA AMP PROBE: CPT | Mod: QW,S$GLB,, | Performed by: INTERNAL MEDICINE

## 2022-11-15 PROCEDURE — 3079F PR MOST RECENT DIASTOLIC BLOOD PRESSURE 80-89 MM HG: ICD-10-PCS | Mod: CPTII,S$GLB,, | Performed by: INTERNAL MEDICINE

## 2022-11-15 PROCEDURE — 1159F PR MEDICATION LIST DOCUMENTED IN MEDICAL RECORD: ICD-10-PCS | Mod: CPTII,S$GLB,, | Performed by: INTERNAL MEDICINE

## 2022-11-15 PROCEDURE — 1159F MED LIST DOCD IN RCRD: CPT | Mod: CPTII,S$GLB,, | Performed by: INTERNAL MEDICINE

## 2022-11-15 PROCEDURE — 3074F SYST BP LT 130 MM HG: CPT | Mod: CPTII,S$GLB,, | Performed by: INTERNAL MEDICINE

## 2022-11-15 PROCEDURE — 87502 INFLUENZA DNA AMP PROBE: CPT | Mod: QW,S$GLB,, | Performed by: INTERNAL MEDICINE

## 2022-11-15 PROCEDURE — 1160F PR REVIEW ALL MEDS BY PRESCRIBER/CLIN PHARMACIST DOCUMENTED: ICD-10-PCS | Mod: CPTII,S$GLB,, | Performed by: INTERNAL MEDICINE

## 2022-11-15 PROCEDURE — 81001 URINALYSIS AUTO W/SCOPE: CPT | Performed by: INTERNAL MEDICINE

## 2022-11-15 PROCEDURE — 3008F PR BODY MASS INDEX (BMI) DOCUMENTED: ICD-10-PCS | Mod: CPTII,S$GLB,, | Performed by: INTERNAL MEDICINE

## 2022-11-15 PROCEDURE — 99213 PR OFFICE/OUTPT VISIT, EST, LEVL III, 20-29 MIN: ICD-10-PCS | Mod: S$GLB,,, | Performed by: INTERNAL MEDICINE

## 2022-11-15 PROCEDURE — 3008F BODY MASS INDEX DOCD: CPT | Mod: CPTII,S$GLB,, | Performed by: INTERNAL MEDICINE

## 2023-04-12 NOTE — PROGRESS NOTES
Subjective:       Patient ID: Haroon Hernadez is a 33 y.o. male.    Chief Complaint: Annual Exam    Pt here for annual exam. Counseled on exercise goals.         Review of Systems   Constitutional:  Negative for fatigue, fever and unexpected weight change.   HENT:  Negative for congestion, rhinorrhea and sore throat.    Eyes:  Negative for visual disturbance.   Respiratory:  Negative for cough and shortness of breath.    Cardiovascular:  Negative for chest pain, palpitations and leg swelling.   Gastrointestinal:  Negative for abdominal pain, blood in stool, constipation and diarrhea.   Genitourinary:  Negative for difficulty urinating and dysuria.   Skin:  Negative for color change and rash.   Neurological:  Negative for dizziness and syncope.   Hematological:  Negative for adenopathy.   Psychiatric/Behavioral:  Negative for dysphoric mood.      Objective:      Physical Exam  Constitutional:       Appearance: He is well-developed.   HENT:      Head: Normocephalic and atraumatic.      Right Ear: External ear normal.      Left Ear: External ear normal.      Mouth/Throat:      Pharynx: No oropharyngeal exudate.   Eyes:      Extraocular Movements: Extraocular movements intact.      Conjunctiva/sclera: Conjunctivae normal.      Pupils: Pupils are equal, round, and reactive to light.   Neck:      Thyroid: No thyromegaly.      Vascular: No carotid bruit.   Cardiovascular:      Rate and Rhythm: Normal rate and regular rhythm.      Heart sounds: Normal heart sounds, S1 normal and S2 normal.   Pulmonary:      Effort: Pulmonary effort is normal.      Breath sounds: Normal breath sounds.   Abdominal:      General: Bowel sounds are normal.      Palpations: Abdomen is soft. There is no mass.      Tenderness: There is no abdominal tenderness.   Musculoskeletal:      Cervical back: Neck supple.      Right lower leg: No edema.      Left lower leg: No edema.   Lymphadenopathy:      Cervical: No cervical adenopathy.    Neurological:      Mental Status: He is alert and oriented to person, place, and time.      Cranial Nerves: No cranial nerve deficit.   Psychiatric:         Mood and Affect: Mood normal.         Behavior: Behavior normal.       Assessment:       1. Wellness examination        Plan:       1. Appropriate labs

## 2023-04-14 ENCOUNTER — OFFICE VISIT (OUTPATIENT)
Dept: INTERNAL MEDICINE | Facility: CLINIC | Age: 33
End: 2023-04-14
Payer: COMMERCIAL

## 2023-04-14 VITALS
BODY MASS INDEX: 22.57 KG/M2 | DIASTOLIC BLOOD PRESSURE: 78 MMHG | WEIGHT: 170.31 LBS | SYSTOLIC BLOOD PRESSURE: 128 MMHG | OXYGEN SATURATION: 98 % | HEART RATE: 83 BPM | HEIGHT: 73 IN

## 2023-04-14 DIAGNOSIS — Z00.00 WELLNESS EXAMINATION: Primary | ICD-10-CM

## 2023-04-14 PROCEDURE — 1160F PR REVIEW ALL MEDS BY PRESCRIBER/CLIN PHARMACIST DOCUMENTED: ICD-10-PCS | Mod: CPTII,S$GLB,, | Performed by: INTERNAL MEDICINE

## 2023-04-14 PROCEDURE — 1159F MED LIST DOCD IN RCRD: CPT | Mod: CPTII,S$GLB,, | Performed by: INTERNAL MEDICINE

## 2023-04-14 PROCEDURE — 3008F BODY MASS INDEX DOCD: CPT | Mod: CPTII,S$GLB,, | Performed by: INTERNAL MEDICINE

## 2023-04-14 PROCEDURE — 3074F SYST BP LT 130 MM HG: CPT | Mod: CPTII,S$GLB,, | Performed by: INTERNAL MEDICINE

## 2023-04-14 PROCEDURE — 99999 PR PBB SHADOW E&M-EST. PATIENT-LVL III: CPT | Mod: PBBFAC,,, | Performed by: INTERNAL MEDICINE

## 2023-04-14 PROCEDURE — 99395 PR PREVENTIVE VISIT,EST,18-39: ICD-10-PCS | Mod: S$GLB,,, | Performed by: INTERNAL MEDICINE

## 2023-04-14 PROCEDURE — 1160F RVW MEDS BY RX/DR IN RCRD: CPT | Mod: CPTII,S$GLB,, | Performed by: INTERNAL MEDICINE

## 2023-04-14 PROCEDURE — 3078F DIAST BP <80 MM HG: CPT | Mod: CPTII,S$GLB,, | Performed by: INTERNAL MEDICINE

## 2023-04-14 PROCEDURE — 3074F PR MOST RECENT SYSTOLIC BLOOD PRESSURE < 130 MM HG: ICD-10-PCS | Mod: CPTII,S$GLB,, | Performed by: INTERNAL MEDICINE

## 2023-04-14 PROCEDURE — 3078F PR MOST RECENT DIASTOLIC BLOOD PRESSURE < 80 MM HG: ICD-10-PCS | Mod: CPTII,S$GLB,, | Performed by: INTERNAL MEDICINE

## 2023-04-14 PROCEDURE — 99395 PREV VISIT EST AGE 18-39: CPT | Mod: S$GLB,,, | Performed by: INTERNAL MEDICINE

## 2023-04-14 PROCEDURE — 3008F PR BODY MASS INDEX (BMI) DOCUMENTED: ICD-10-PCS | Mod: CPTII,S$GLB,, | Performed by: INTERNAL MEDICINE

## 2023-04-14 PROCEDURE — 1159F PR MEDICATION LIST DOCUMENTED IN MEDICAL RECORD: ICD-10-PCS | Mod: CPTII,S$GLB,, | Performed by: INTERNAL MEDICINE

## 2023-04-14 PROCEDURE — 99999 PR PBB SHADOW E&M-EST. PATIENT-LVL III: ICD-10-PCS | Mod: PBBFAC,,, | Performed by: INTERNAL MEDICINE

## 2023-04-20 ENCOUNTER — LAB VISIT (OUTPATIENT)
Dept: LAB | Facility: OTHER | Age: 33
End: 2023-04-20
Attending: INTERNAL MEDICINE
Payer: COMMERCIAL

## 2023-04-20 DIAGNOSIS — Z00.00 WELLNESS EXAMINATION: ICD-10-CM

## 2023-04-20 LAB
ALBUMIN SERPL BCP-MCNC: 4.4 G/DL (ref 3.5–5.2)
ALP SERPL-CCNC: 60 U/L (ref 55–135)
ALT SERPL W/O P-5'-P-CCNC: 19 U/L (ref 10–44)
ANION GAP SERPL CALC-SCNC: 6 MMOL/L (ref 8–16)
AST SERPL-CCNC: 20 U/L (ref 10–40)
BASOPHILS # BLD AUTO: 0.04 K/UL (ref 0–0.2)
BASOPHILS NFR BLD: 0.7 % (ref 0–1.9)
BILIRUB SERPL-MCNC: 2 MG/DL (ref 0.1–1)
BUN SERPL-MCNC: 9 MG/DL (ref 6–20)
CALCIUM SERPL-MCNC: 9.9 MG/DL (ref 8.7–10.5)
CHLORIDE SERPL-SCNC: 109 MMOL/L (ref 95–110)
CHOLEST SERPL-MCNC: 188 MG/DL (ref 120–199)
CHOLEST/HDLC SERPL: 3.6 {RATIO} (ref 2–5)
CO2 SERPL-SCNC: 27 MMOL/L (ref 23–29)
CREAT SERPL-MCNC: 0.9 MG/DL (ref 0.5–1.4)
DIFFERENTIAL METHOD: ABNORMAL
EOSINOPHIL # BLD AUTO: 0.2 K/UL (ref 0–0.5)
EOSINOPHIL NFR BLD: 3.2 % (ref 0–8)
ERYTHROCYTE [DISTWIDTH] IN BLOOD BY AUTOMATED COUNT: 12.7 % (ref 11.5–14.5)
EST. GFR  (NO RACE VARIABLE): >60 ML/MIN/1.73 M^2
GLUCOSE SERPL-MCNC: 98 MG/DL (ref 70–110)
HCT VFR BLD AUTO: 45.3 % (ref 40–54)
HDLC SERPL-MCNC: 52 MG/DL (ref 40–75)
HDLC SERPL: 27.7 % (ref 20–50)
HGB BLD-MCNC: 15.6 G/DL (ref 14–18)
IMM GRANULOCYTES # BLD AUTO: 0.01 K/UL (ref 0–0.04)
IMM GRANULOCYTES NFR BLD AUTO: 0.2 % (ref 0–0.5)
LDLC SERPL CALC-MCNC: 112.6 MG/DL (ref 63–159)
LYMPHOCYTES # BLD AUTO: 2.8 K/UL (ref 1–4.8)
LYMPHOCYTES NFR BLD: 49.7 % (ref 18–48)
MCH RBC QN AUTO: 30.2 PG (ref 27–31)
MCHC RBC AUTO-ENTMCNC: 34.4 G/DL (ref 32–36)
MCV RBC AUTO: 88 FL (ref 82–98)
MONOCYTES # BLD AUTO: 0.3 K/UL (ref 0.3–1)
MONOCYTES NFR BLD: 4.4 % (ref 4–15)
NEUTROPHILS # BLD AUTO: 2.4 K/UL (ref 1.8–7.7)
NEUTROPHILS NFR BLD: 41.8 % (ref 38–73)
NONHDLC SERPL-MCNC: 136 MG/DL
NRBC BLD-RTO: 0 /100 WBC
PLATELET # BLD AUTO: 214 K/UL (ref 150–450)
PMV BLD AUTO: 10 FL (ref 9.2–12.9)
POTASSIUM SERPL-SCNC: 4.9 MMOL/L (ref 3.5–5.1)
PROT SERPL-MCNC: 7.1 G/DL (ref 6–8.4)
RBC # BLD AUTO: 5.17 M/UL (ref 4.6–6.2)
SODIUM SERPL-SCNC: 142 MMOL/L (ref 136–145)
TRIGL SERPL-MCNC: 117 MG/DL (ref 30–150)
WBC # BLD AUTO: 5.65 K/UL (ref 3.9–12.7)

## 2023-04-20 PROCEDURE — 85025 COMPLETE CBC W/AUTO DIFF WBC: CPT | Performed by: INTERNAL MEDICINE

## 2023-04-20 PROCEDURE — 80061 LIPID PANEL: CPT | Performed by: INTERNAL MEDICINE

## 2023-04-20 PROCEDURE — 36415 COLL VENOUS BLD VENIPUNCTURE: CPT | Performed by: INTERNAL MEDICINE

## 2023-04-20 PROCEDURE — 80053 COMPREHEN METABOLIC PANEL: CPT | Performed by: INTERNAL MEDICINE

## 2023-04-21 ENCOUNTER — OFFICE VISIT (OUTPATIENT)
Dept: OPTOMETRY | Facility: CLINIC | Age: 33
End: 2023-04-21
Payer: COMMERCIAL

## 2023-04-21 ENCOUNTER — PATIENT MESSAGE (OUTPATIENT)
Dept: INTERNAL MEDICINE | Facility: CLINIC | Age: 33
End: 2023-04-21
Payer: COMMERCIAL

## 2023-04-21 DIAGNOSIS — H52.13 MYOPIA WITH ASTIGMATISM, BILATERAL: Primary | ICD-10-CM

## 2023-04-21 DIAGNOSIS — Z46.0 FITTING AND ADJUSTMENT OF SPECTACLES AND CONTACT LENSES: Primary | ICD-10-CM

## 2023-04-21 DIAGNOSIS — Z97.3 WEARS CONTACT LENSES: ICD-10-CM

## 2023-04-21 DIAGNOSIS — H52.203 MYOPIA WITH ASTIGMATISM, BILATERAL: Primary | ICD-10-CM

## 2023-04-21 PROCEDURE — 1160F PR REVIEW ALL MEDS BY PRESCRIBER/CLIN PHARMACIST DOCUMENTED: ICD-10-PCS | Mod: CPTII,S$GLB,, | Performed by: OPTOMETRIST

## 2023-04-21 PROCEDURE — 92310 PR CONTACT LENS FITTING (NO CHANGE): ICD-10-PCS | Mod: CSM,,, | Performed by: OPTOMETRIST

## 2023-04-21 PROCEDURE — 99999 PR PBB SHADOW E&M-EST. PATIENT-LVL II: CPT | Mod: PBBFAC,,, | Performed by: OPTOMETRIST

## 2023-04-21 PROCEDURE — 92015 PR REFRACTION: ICD-10-PCS | Mod: S$GLB,,, | Performed by: OPTOMETRIST

## 2023-04-21 PROCEDURE — 1159F PR MEDICATION LIST DOCUMENTED IN MEDICAL RECORD: ICD-10-PCS | Mod: CPTII,S$GLB,, | Performed by: OPTOMETRIST

## 2023-04-21 PROCEDURE — 1159F MED LIST DOCD IN RCRD: CPT | Mod: CPTII,S$GLB,, | Performed by: OPTOMETRIST

## 2023-04-21 PROCEDURE — 92310 CONTACT LENS FITTING OU: CPT | Mod: CSM,,, | Performed by: OPTOMETRIST

## 2023-04-21 PROCEDURE — 99999 PR PBB SHADOW E&M-EST. PATIENT-LVL II: ICD-10-PCS | Mod: PBBFAC,,, | Performed by: OPTOMETRIST

## 2023-04-21 PROCEDURE — 92014 PR EYE EXAM, EST PATIENT,COMPREHESV: ICD-10-PCS | Mod: S$GLB,,, | Performed by: OPTOMETRIST

## 2023-04-21 PROCEDURE — 92014 COMPRE OPH EXAM EST PT 1/>: CPT | Mod: S$GLB,,, | Performed by: OPTOMETRIST

## 2023-04-21 PROCEDURE — 1160F RVW MEDS BY RX/DR IN RCRD: CPT | Mod: CPTII,S$GLB,, | Performed by: OPTOMETRIST

## 2023-04-21 PROCEDURE — 92015 DETERMINE REFRACTIVE STATE: CPT | Mod: S$GLB,,, | Performed by: OPTOMETRIST

## 2023-04-21 NOTE — PROGRESS NOTES
HPI     Contact Lens Fit            Comments: JUSTIN: 5/17/2021 - Dr. Neves          Comments    Presents today for routine/CLFU. Pt reports no vision complaints. Reports   occ floaters--no flashes. Pt denies eye pain. No gtts.           Last edited by Adelaida Portillo MA on 4/21/2023  8:23 AM.            Assessment /Plan     For exam results, see Encounter Report.    Myopia with astigmatism, bilateral    Wears contact lenses      Good fit w OASYS 1-DAY astig (part time wear) but needs power adjustment.  We do NOT have guardado in stock pt needs  Hx glauc susp by mod cupping (see Dr Neves notes).  Iop wnl without meds.  Last VF wnl.  -fam hx.  Pach: 523/519.  Doubt glauc now--will monitor    PLAN:    Sent pt to [facility] to get TRIALs in guardado he needs.  I do NOT need to see at disp  Continue Daily Wear schedule.  NO SLEEPING IN CONTACT LENSES. Exchange daily   If no problems will call for CLRx, rtc 1 yr

## 2023-04-27 ENCOUNTER — PATIENT MESSAGE (OUTPATIENT)
Dept: OPTOMETRY | Facility: CLINIC | Age: 33
End: 2023-04-27
Payer: COMMERCIAL

## 2023-08-08 ENCOUNTER — TELEPHONE (OUTPATIENT)
Dept: PRIMARY CARE CLINIC | Facility: CLINIC | Age: 33
End: 2023-08-08
Payer: COMMERCIAL

## 2023-08-08 NOTE — TELEPHONE ENCOUNTER
Lvm and sent portal message informing pt that sore throat and headache could not be evaluated through virtual appt

## 2023-08-12 ENCOUNTER — OFFICE VISIT (OUTPATIENT)
Dept: INTERNAL MEDICINE | Facility: CLINIC | Age: 33
End: 2023-08-12
Payer: COMMERCIAL

## 2023-08-12 VITALS
DIASTOLIC BLOOD PRESSURE: 76 MMHG | WEIGHT: 171.94 LBS | HEIGHT: 73 IN | OXYGEN SATURATION: 98 % | SYSTOLIC BLOOD PRESSURE: 122 MMHG | BODY MASS INDEX: 22.79 KG/M2 | HEART RATE: 93 BPM

## 2023-08-12 DIAGNOSIS — J06.9 VIRAL URI: ICD-10-CM

## 2023-08-12 DIAGNOSIS — H66.002 NON-RECURRENT ACUTE SUPPURATIVE OTITIS MEDIA OF LEFT EAR WITHOUT SPONTANEOUS RUPTURE OF TYMPANIC MEMBRANE: Primary | ICD-10-CM

## 2023-08-12 LAB
CTP QC/QA: YES
CTP QC/QA: YES
MOLECULAR STREP A: NEGATIVE
SARS-COV-2 RDRP RESP QL NAA+PROBE: NEGATIVE

## 2023-08-12 PROCEDURE — 3074F PR MOST RECENT SYSTOLIC BLOOD PRESSURE < 130 MM HG: ICD-10-PCS | Mod: CPTII,S$GLB,, | Performed by: INTERNAL MEDICINE

## 2023-08-12 PROCEDURE — 1160F RVW MEDS BY RX/DR IN RCRD: CPT | Mod: CPTII,S$GLB,, | Performed by: INTERNAL MEDICINE

## 2023-08-12 PROCEDURE — 3008F BODY MASS INDEX DOCD: CPT | Mod: CPTII,S$GLB,, | Performed by: INTERNAL MEDICINE

## 2023-08-12 PROCEDURE — 87651 STREP A DNA AMP PROBE: CPT | Mod: QW,S$GLB,, | Performed by: INTERNAL MEDICINE

## 2023-08-12 PROCEDURE — 87635 SARS-COV-2 COVID-19 AMP PRB: CPT | Mod: QW,S$GLB,, | Performed by: INTERNAL MEDICINE

## 2023-08-12 PROCEDURE — 87635: ICD-10-PCS | Mod: QW,S$GLB,, | Performed by: INTERNAL MEDICINE

## 2023-08-12 PROCEDURE — 99214 OFFICE O/P EST MOD 30 MIN: CPT | Mod: S$GLB,,, | Performed by: INTERNAL MEDICINE

## 2023-08-12 PROCEDURE — 1159F PR MEDICATION LIST DOCUMENTED IN MEDICAL RECORD: ICD-10-PCS | Mod: CPTII,S$GLB,, | Performed by: INTERNAL MEDICINE

## 2023-08-12 PROCEDURE — 99999 PR PBB SHADOW E&M-EST. PATIENT-LVL III: ICD-10-PCS | Mod: PBBFAC,,, | Performed by: INTERNAL MEDICINE

## 2023-08-12 PROCEDURE — 3078F DIAST BP <80 MM HG: CPT | Mod: CPTII,S$GLB,, | Performed by: INTERNAL MEDICINE

## 2023-08-12 PROCEDURE — 3008F PR BODY MASS INDEX (BMI) DOCUMENTED: ICD-10-PCS | Mod: CPTII,S$GLB,, | Performed by: INTERNAL MEDICINE

## 2023-08-12 PROCEDURE — 99999 PR PBB SHADOW E&M-EST. PATIENT-LVL III: CPT | Mod: PBBFAC,,, | Performed by: INTERNAL MEDICINE

## 2023-08-12 PROCEDURE — 99214 PR OFFICE/OUTPT VISIT, EST, LEVL IV, 30-39 MIN: ICD-10-PCS | Mod: S$GLB,,, | Performed by: INTERNAL MEDICINE

## 2023-08-12 PROCEDURE — 1159F MED LIST DOCD IN RCRD: CPT | Mod: CPTII,S$GLB,, | Performed by: INTERNAL MEDICINE

## 2023-08-12 PROCEDURE — 1160F PR REVIEW ALL MEDS BY PRESCRIBER/CLIN PHARMACIST DOCUMENTED: ICD-10-PCS | Mod: CPTII,S$GLB,, | Performed by: INTERNAL MEDICINE

## 2023-08-12 PROCEDURE — 3078F PR MOST RECENT DIASTOLIC BLOOD PRESSURE < 80 MM HG: ICD-10-PCS | Mod: CPTII,S$GLB,, | Performed by: INTERNAL MEDICINE

## 2023-08-12 PROCEDURE — 3074F SYST BP LT 130 MM HG: CPT | Mod: CPTII,S$GLB,, | Performed by: INTERNAL MEDICINE

## 2023-08-12 PROCEDURE — 87651 POCT STREP A MOLECULAR: ICD-10-PCS | Mod: QW,S$GLB,, | Performed by: INTERNAL MEDICINE

## 2023-08-12 RX ORDER — AMOXICILLIN AND CLAVULANATE POTASSIUM 875; 125 MG/1; MG/1
1 TABLET, FILM COATED ORAL EVERY 12 HOURS
Qty: 14 TABLET | Refills: 0 | Status: SHIPPED | OUTPATIENT
Start: 2023-08-12 | End: 2023-08-19

## 2023-08-12 NOTE — PROGRESS NOTES
Assessment:       1. Non-recurrent acute suppurative otitis media of left ear without spontaneous rupture of tympanic membrane    2. Viral URI          Plan:         Haroon was seen today for sore throat and otalgia.    Diagnoses and all orders for this visit:    Non-recurrent acute suppurative otitis media of left ear without spontaneous rupture of tympanic membrane  Signs, symptoms consistent with viral upper respiratory illness and otitis media  history and pyhsical exam does not suggest menintigits Pneumonia Influenza ABRS  I have reviewed prior testing and additional testing was was given  New medication as below   I provided instruction on supportive care measures   reviewed signs and symptoms that should prompt return to provider or evaluation in the ED  -     amoxicillin-clavulanate 875-125mg (AUGMENTIN) 875-125 mg per tablet; Take 1 tablet by mouth every 12 (twelve) hours. for 7 days    Viral URI  -     POCT COVID-19 Rapid Screening  -     POCT Strep A, Molecular          Subjective:       Patient ID: Haroon Hernadez is a 33 y.o. male.    Chief Complaint: Sore Throat and Otalgia    URI         Patient reports that symptoms started   sore throat starteeed one week ago and rining in the ear started yesterds   prior to presentation .     Symptoms are are worsening       Symptoms include sore throat    Patient denies cough, chest pain, hemoptysis, dyspnea, wheezing or change taste/smell    They have tried zrytec   for the symptoms     Associated symptoms include severe tinnitis       Wife sick with covid las tweek   sick contact    Recent travel; recently got back from [place]     Recent visits    Immunization History   Administered Date(s) Administered    COVID-19, MRNA, LN-S, PF (MODERNA FULL 0.5 ML DOSE) 01/15/2021, 02/12/2021, 11/26/2021    COVID-19, mRNA, LNP-S, bivalent booster, PF (PFIZER OMICRON) 09/26/2022    Influenza 11/22/2018, 11/10/2020    Influenza - Quadrivalent - PF *Preferred* (6 months  "and older) 11/15/2018, 10/08/2019, 11/26/2021    Td - PF (ADULT) 07/18/2020       Tobacco Use: Low Risk  (8/12/2023)    Patient History     Smoking Tobacco Use: Never     Smokeless Tobacco Use: Never     Passive Exposure: Not on file           Patient Active Problem List   Diagnosis    Gilbert disease    Bleeding hemorrhoids    Joint pain    Anxiety    Weakness    Decreased range of motion    Wears contact lenses           HPI    Review of Systems   All other systems reviewed and are negative.            There are no preventive care reminders to display for this patient.      Objective:     /76 (BP Location: Left arm, Patient Position: Sitting)   Pulse 93   Ht 6' 1" (1.854 m)   Wt 78 kg (171 lb 15.3 oz)   SpO2 98%   BMI 22.69 kg/m²     Vitals 8/12/2023 4/14/2023 11/15/2022 9/9/2022 3/9/2022   Height 73 73 73 - 73   Weight (lbs) 171.96 170.31 161.82 159.94 155.65   BMI (kg/m2) 22.7 22.5 21.4 - 20.5                Physical Exam  Nursing note reviewed.   Constitutional:       General: He is not in acute distress.     Appearance: Normal appearance. He is not ill-appearing, toxic-appearing or diaphoretic.   HENT:      Head: Normocephalic.      Right Ear: Tympanic membrane normal.      Ears:      Comments: R ear with purulent , bulging , erythematous TM   Eyes:      Conjunctiva/sclera: Conjunctivae normal.   Pulmonary:      Effort: Pulmonary effort is normal. No respiratory distress.   Neurological:      General: No focal deficit present.      Mental Status: He is alert and oriented to person, place, and time.   Psychiatric:         Mood and Affect: Mood normal.         Behavior: Behavior normal.         Thought Content: Thought content normal.         Judgment: Judgment normal.             No future appointments.          Disclaimer:  This note has been generated using voice-recognition software. There may be grammatical or spelling errors that have been missed during proof-reading   "

## 2023-08-14 ENCOUNTER — PATIENT MESSAGE (OUTPATIENT)
Dept: OTOLARYNGOLOGY | Facility: CLINIC | Age: 33
End: 2023-08-14
Payer: COMMERCIAL

## 2023-10-31 ENCOUNTER — OFFICE VISIT (OUTPATIENT)
Dept: INTERNAL MEDICINE | Facility: CLINIC | Age: 33
End: 2023-10-31
Payer: COMMERCIAL

## 2023-10-31 ENCOUNTER — HOSPITAL ENCOUNTER (OUTPATIENT)
Dept: RADIOLOGY | Facility: HOSPITAL | Age: 33
Discharge: HOME | End: 2023-10-31
Attending: INTERNAL MEDICINE
Payer: COMMERCIAL

## 2023-10-31 VITALS
HEIGHT: 73 IN | OXYGEN SATURATION: 99 % | WEIGHT: 175.06 LBS | RESPIRATION RATE: 12 BRPM | DIASTOLIC BLOOD PRESSURE: 64 MMHG | HEART RATE: 76 BPM | TEMPERATURE: 98 F | SYSTOLIC BLOOD PRESSURE: 122 MMHG | BODY MASS INDEX: 23.2 KG/M2

## 2023-10-31 DIAGNOSIS — R42 LIGHTHEADED: Primary | ICD-10-CM

## 2023-10-31 DIAGNOSIS — E55.9 VITAMIN D DEFICIENCY: ICD-10-CM

## 2023-10-31 DIAGNOSIS — R53.83 FATIGUE, UNSPECIFIED TYPE: ICD-10-CM

## 2023-10-31 DIAGNOSIS — R42 LIGHTHEADED: ICD-10-CM

## 2023-10-31 PROCEDURE — 71046 XR CHEST PA AND LATERAL: ICD-10-PCS | Mod: 26,,, | Performed by: RADIOLOGY

## 2023-10-31 PROCEDURE — 71046 X-RAY EXAM CHEST 2 VIEWS: CPT | Mod: TC,PO

## 2023-10-31 PROCEDURE — 3074F SYST BP LT 130 MM HG: CPT | Mod: CPTII,S$GLB,, | Performed by: INTERNAL MEDICINE

## 2023-10-31 PROCEDURE — 93005 ELECTROCARDIOGRAM TRACING: CPT | Mod: S$GLB,,, | Performed by: INTERNAL MEDICINE

## 2023-10-31 PROCEDURE — 99214 OFFICE O/P EST MOD 30 MIN: CPT | Mod: S$GLB,,, | Performed by: INTERNAL MEDICINE

## 2023-10-31 PROCEDURE — 99999 PR PBB SHADOW E&M-EST. PATIENT-LVL III: CPT | Mod: PBBFAC,,, | Performed by: INTERNAL MEDICINE

## 2023-10-31 PROCEDURE — 3078F PR MOST RECENT DIASTOLIC BLOOD PRESSURE < 80 MM HG: ICD-10-PCS | Mod: CPTII,S$GLB,, | Performed by: INTERNAL MEDICINE

## 2023-10-31 PROCEDURE — 3008F BODY MASS INDEX DOCD: CPT | Mod: CPTII,S$GLB,, | Performed by: INTERNAL MEDICINE

## 2023-10-31 PROCEDURE — 1160F PR REVIEW ALL MEDS BY PRESCRIBER/CLIN PHARMACIST DOCUMENTED: ICD-10-PCS | Mod: CPTII,S$GLB,, | Performed by: INTERNAL MEDICINE

## 2023-10-31 PROCEDURE — 1159F PR MEDICATION LIST DOCUMENTED IN MEDICAL RECORD: ICD-10-PCS | Mod: CPTII,S$GLB,, | Performed by: INTERNAL MEDICINE

## 2023-10-31 PROCEDURE — 71046 X-RAY EXAM CHEST 2 VIEWS: CPT | Mod: 26,,, | Performed by: RADIOLOGY

## 2023-10-31 PROCEDURE — 93010 ELECTROCARDIOGRAM REPORT: CPT | Mod: S$GLB,,, | Performed by: INTERNAL MEDICINE

## 2023-10-31 PROCEDURE — 93005 EKG 12-LEAD: ICD-10-PCS | Mod: S$GLB,,, | Performed by: INTERNAL MEDICINE

## 2023-10-31 PROCEDURE — 1160F RVW MEDS BY RX/DR IN RCRD: CPT | Mod: CPTII,S$GLB,, | Performed by: INTERNAL MEDICINE

## 2023-10-31 PROCEDURE — 3008F PR BODY MASS INDEX (BMI) DOCUMENTED: ICD-10-PCS | Mod: CPTII,S$GLB,, | Performed by: INTERNAL MEDICINE

## 2023-10-31 PROCEDURE — 1159F MED LIST DOCD IN RCRD: CPT | Mod: CPTII,S$GLB,, | Performed by: INTERNAL MEDICINE

## 2023-10-31 PROCEDURE — 3074F PR MOST RECENT SYSTOLIC BLOOD PRESSURE < 130 MM HG: ICD-10-PCS | Mod: CPTII,S$GLB,, | Performed by: INTERNAL MEDICINE

## 2023-10-31 PROCEDURE — 99999 PR PBB SHADOW E&M-EST. PATIENT-LVL III: ICD-10-PCS | Mod: PBBFAC,,, | Performed by: INTERNAL MEDICINE

## 2023-10-31 PROCEDURE — 99214 PR OFFICE/OUTPT VISIT, EST, LEVL IV, 30-39 MIN: ICD-10-PCS | Mod: S$GLB,,, | Performed by: INTERNAL MEDICINE

## 2023-10-31 PROCEDURE — 93010 EKG 12-LEAD: ICD-10-PCS | Mod: S$GLB,,, | Performed by: INTERNAL MEDICINE

## 2023-10-31 PROCEDURE — 3078F DIAST BP <80 MM HG: CPT | Mod: CPTII,S$GLB,, | Performed by: INTERNAL MEDICINE

## 2023-10-31 NOTE — PROGRESS NOTES
Subjective:       Patient ID: Haroon Hernadez is a 33 y.o. male.    Chief Complaint: Establish Care    HPI    33-year-old male here for evaluation of a lingering virus.  This has been going on for two weeks.  He felt miserable after running.  Any kind of activity exacerbated things.  By Saturday night, he had a fever to 101.  Diarrhea was pretty bad.  He felt miserable the next 3 days.  He took two home COVID tests that were negative.  If he does physical exercise, he feels lightheaded. This is around exertion.  Before getting sick, he was exercising 3-4 times a week.  He does cardio - he was running 3 miles a time.  He reports that he can go a mile and a half before he feels lightheaded.    Review of Systems      Objective:      Physical Exam  Vitals reviewed.   Constitutional:       Appearance: He is well-developed.   HENT:      Head: Normocephalic and atraumatic.      Mouth/Throat:      Pharynx: No oropharyngeal exudate.   Eyes:      General: No scleral icterus.        Right eye: No discharge.         Left eye: No discharge.      Pupils: Pupils are equal, round, and reactive to light.   Neck:      Thyroid: No thyromegaly.      Trachea: No tracheal deviation.   Cardiovascular:      Rate and Rhythm: Normal rate and regular rhythm.      Heart sounds: Normal heart sounds. No murmur heard.     No friction rub. No gallop.   Pulmonary:      Effort: Pulmonary effort is normal. No respiratory distress.      Breath sounds: Normal breath sounds. No wheezing or rales.   Chest:      Chest wall: No tenderness.   Abdominal:      General: Bowel sounds are normal. There is no distension.      Palpations: Abdomen is soft. There is no mass.      Tenderness: There is no abdominal tenderness. There is no guarding or rebound.   Musculoskeletal:         General: No tenderness. Normal range of motion.      Cervical back: Normal range of motion and neck supple.   Skin:     General: Skin is warm and dry.      Coloration: Skin is not  pale.      Findings: No erythema or rash.   Neurological:      Mental Status: He is alert and oriented to person, place, and time.   Psychiatric:         Behavior: Behavior normal.         Assessment:       1. Lightheaded  - CBC Auto Differential; Future  - Comprehensive Metabolic Panel; Future  - TSH; Future  - IN OFFICE EKG 12-LEAD (to Muse)  - Exercise Stress - EKG; Future  - X-Ray Chest PA And Lateral; Future    2. Fatigue, unspecified type  - CBC Auto Differential; Future  - Comprehensive Metabolic Panel; Future  - TSH; Future  - IN OFFICE EKG 12-LEAD (to Muse)  - Exercise Stress - EKG; Future  - X-Ray Chest PA And Lateral; Future    3. Vitamin D deficiency  - Vitamin D; Future      Plan:       1/2.  Check CBC, CMP, TSH, EKG, chest x-ray, exercise stress EKG.  3. Check vitamin-D.

## 2024-03-20 ENCOUNTER — LAB VISIT (OUTPATIENT)
Dept: LAB | Facility: OTHER | Age: 34
End: 2024-03-20
Attending: INTERNAL MEDICINE
Payer: COMMERCIAL

## 2024-03-20 ENCOUNTER — OFFICE VISIT (OUTPATIENT)
Dept: INTERNAL MEDICINE | Facility: CLINIC | Age: 34
End: 2024-03-20
Payer: COMMERCIAL

## 2024-03-20 VITALS
BODY MASS INDEX: 23.03 KG/M2 | OXYGEN SATURATION: 99 % | WEIGHT: 173.75 LBS | SYSTOLIC BLOOD PRESSURE: 116 MMHG | DIASTOLIC BLOOD PRESSURE: 74 MMHG | HEIGHT: 73 IN | HEART RATE: 67 BPM

## 2024-03-20 DIAGNOSIS — Z00.00 ROUTINE PHYSICAL EXAMINATION: Primary | ICD-10-CM

## 2024-03-20 DIAGNOSIS — Z00.00 ROUTINE PHYSICAL EXAMINATION: ICD-10-CM

## 2024-03-20 DIAGNOSIS — E80.4 GILBERT DISEASE: ICD-10-CM

## 2024-03-20 DIAGNOSIS — H93.13 TINNITUS OF BOTH EARS: ICD-10-CM

## 2024-03-20 DIAGNOSIS — K60.2 ANAL FISSURE: ICD-10-CM

## 2024-03-20 DIAGNOSIS — M79.89 SOFT TISSUE MASS: ICD-10-CM

## 2024-03-20 LAB
CHOLEST SERPL-MCNC: 178 MG/DL (ref 120–199)
CHOLEST/HDLC SERPL: 3.7 {RATIO} (ref 2–5)
HDLC SERPL-MCNC: 48 MG/DL (ref 40–75)
HDLC SERPL: 27 % (ref 20–50)
LDLC SERPL CALC-MCNC: 118.8 MG/DL (ref 63–159)
NONHDLC SERPL-MCNC: 130 MG/DL
TRIGL SERPL-MCNC: 56 MG/DL (ref 30–150)

## 2024-03-20 PROCEDURE — 99395 PREV VISIT EST AGE 18-39: CPT | Mod: S$GLB,,, | Performed by: INTERNAL MEDICINE

## 2024-03-20 PROCEDURE — 3008F BODY MASS INDEX DOCD: CPT | Mod: CPTII,S$GLB,, | Performed by: INTERNAL MEDICINE

## 2024-03-20 PROCEDURE — 1159F MED LIST DOCD IN RCRD: CPT | Mod: CPTII,S$GLB,, | Performed by: INTERNAL MEDICINE

## 2024-03-20 PROCEDURE — 3078F DIAST BP <80 MM HG: CPT | Mod: CPTII,S$GLB,, | Performed by: INTERNAL MEDICINE

## 2024-03-20 PROCEDURE — 99999 PR PBB SHADOW E&M-EST. PATIENT-LVL V: CPT | Mod: PBBFAC,,, | Performed by: INTERNAL MEDICINE

## 2024-03-20 PROCEDURE — 3074F SYST BP LT 130 MM HG: CPT | Mod: CPTII,S$GLB,, | Performed by: INTERNAL MEDICINE

## 2024-03-20 PROCEDURE — 1160F RVW MEDS BY RX/DR IN RCRD: CPT | Mod: CPTII,S$GLB,, | Performed by: INTERNAL MEDICINE

## 2024-03-20 PROCEDURE — 80061 LIPID PANEL: CPT | Performed by: INTERNAL MEDICINE

## 2024-03-20 PROCEDURE — 36415 COLL VENOUS BLD VENIPUNCTURE: CPT | Performed by: INTERNAL MEDICINE

## 2024-03-20 NOTE — PROGRESS NOTES
Subjective:       Patient ID: Haroon Hernadez is a 34 y.o. male.    Chief Complaint: Annual Exam and Tinnitus    New patient to me here for annual exam English care several issues.  Wanted to talk about chronic tinnitus, some lumps on his arms and intermittent recurrent anal fissure.  He would like to see an ear specialists in ENT in particular to discuss TRT, tinnitus retraining therapy  He has developed a new lump on the left forearm and has a previous 1 on the right upper arm.  We can assist him with seeing General surgery as he would like to get these definitively evaluated and removed.    Lastly he has had a intermittent anal fissure and would like to see a colorectal surgery specialists for their opinion.  Lab was done in October except he did not have a lipid panel so we will update that.  He also has a history of low vitamin-D and I encouraged him to supplement that.  We reviewed his family history.      Review of Systems   Constitutional:  Negative for chills, fatigue and fever.   HENT:  Positive for tinnitus. Negative for nosebleeds and trouble swallowing.    Eyes:  Negative for pain and visual disturbance.   Respiratory:  Negative for cough, shortness of breath and wheezing.    Cardiovascular:  Negative for chest pain and palpitations.   Gastrointestinal:  Positive for rectal pain (anal fissure). Negative for abdominal pain, constipation, diarrhea, nausea and vomiting.   Genitourinary:  Negative for difficulty urinating and hematuria.   Musculoskeletal:  Negative for arthralgias, back pain and neck pain.   Integumentary:  Positive for mole/lesion (lump on the ventral left forearm and right tricep region.). Negative for rash.   Neurological:  Negative for dizziness and headaches.   Hematological:  Does not bruise/bleed easily.   Psychiatric/Behavioral:  Negative for dysphoric mood and sleep disturbance.            Past Medical History:   Diagnosis Date    Anxiety     Gilbert disease     Urticaria       Past Surgical History:   Procedure Laterality Date    INCISION AND DRAINAGE Right 07/18/2020    Procedure: Incision and Drainage Right hand with foreign body removal;  hand table; cysto tubing;  Surgeon: Norman Reeves MD;  Location: [address];  Service: Orthopedics;  Laterality: Right;    plyoric      plyoricsynosis    REMOVAL OF FOREIGN BODY FROM HAND  07/18/2020    Procedure: REMOVAL, FOREIGN BODY, HAND;  Surgeon: Norman Reeves MD;  Location: [address];  Service: Orthopedics;;    ULNAR NERVE REPAIR      WISDOM TOOTH EXTRACTION        Patient Active Problem List   Diagnosis    Gilbert disease    Bleeding hemorrhoids    Joint pain    Anxiety    Weakness    Decreased range of motion    Wears contact lenses        Objective:      Physical Exam  Constitutional:       General: He is not in acute distress.     Appearance: He is well-developed.   HENT:      Head: Normocephalic and atraumatic.      Right Ear: Tympanic membrane, ear canal and external ear normal.      Left Ear: Tympanic membrane, ear canal and external ear normal.      Mouth/Throat:      Pharynx: No oropharyngeal exudate or posterior oropharyngeal erythema.   Eyes:      General: No scleral icterus.     Conjunctiva/sclera: Conjunctivae normal.      Pupils: Pupils are equal, round, and reactive to light.   Neck:      Thyroid: No thyromegaly.      Comments: No supraclavicular nodes palpated  Cardiovascular:      Rate and Rhythm: Normal rate and regular rhythm.      Pulses: Normal pulses.      Heart sounds: Normal heart sounds. No murmur heard.  Pulmonary:      Effort: Pulmonary effort is normal.      Breath sounds: Normal breath sounds. No wheezing.   Abdominal:      General: Bowel sounds are normal.      Palpations: Abdomen is soft. There is no mass.      Tenderness: There is no abdominal tenderness.   Genitourinary:     Comments: Deferred to CRS  Musculoskeletal:         General: No tenderness.      Cervical back: Normal range of  "motion and neck supple.      Right lower leg: No edema.      Left lower leg: No edema.   Lymphadenopathy:      Cervical: No cervical adenopathy.   Skin:     Coloration: Skin is not jaundiced or pale.      Findings: Lesion (Suspectlipomas-left forearm right triceps.  Round rubbery mobile soft tissue lumps) present.   Neurological:      General: No focal deficit present.      Mental Status: He is alert and oriented to person, place, and time.   Psychiatric:         Mood and Affect: Mood normal.         Behavior: Behavior normal.         Assessment:       Problem List Items Addressed This Visit          GI    Gilbert disease     Other Visit Diagnoses       Routine physical examination    -  Primary    Relevant Orders    LIPID PANEL    Anal fissure        Relevant Orders    Ambulatory referral/consult to Colorectal Surgery    Tinnitus of both ears        Relevant Orders    Ambulatory referral/consult to ENT    Soft tissue mass        Relevant Orders    Ambulatory referral/consult to General Surgery            Plan:         Haroon was seen today for annual exam and tinnitus.    Diagnoses and all orders for this visit:    Routine physical examination  -     LIPID PANEL; Future    Anal fissure  -     Ambulatory referral/consult to Colorectal Surgery; Future    Tinnitus of both ears  -     Ambulatory referral/consult to ENT; Future    Soft tissue mass  -     Ambulatory referral/consult to General Surgery; Future    Gilbert disease           He has had a pertussis vaccine.  Review all studies and consults  Follow-up annually          Portions of this note may have been created with voice recognition software. Occasional "wrong-word" or "sound-a-like" substitutions may have occurred due to the inherent limitations of voice recognition software. Please, read the note carefully and recognize, using context, where substitutions have occurred.  "

## 2024-03-26 ENCOUNTER — PATIENT MESSAGE (OUTPATIENT)
Dept: INTERNAL MEDICINE | Facility: CLINIC | Age: 34
End: 2024-03-26
Payer: COMMERCIAL

## 2024-05-16 ENCOUNTER — OFFICE VISIT (OUTPATIENT)
Dept: SURGERY | Facility: CLINIC | Age: 34
End: 2024-05-16
Attending: SPECIALIST
Payer: COMMERCIAL

## 2024-05-16 VITALS
BODY MASS INDEX: 21.87 KG/M2 | DIASTOLIC BLOOD PRESSURE: 72 MMHG | SYSTOLIC BLOOD PRESSURE: 126 MMHG | HEART RATE: 70 BPM | WEIGHT: 165 LBS | OXYGEN SATURATION: 99 % | HEIGHT: 73 IN

## 2024-05-16 DIAGNOSIS — M79.89 SOFT TISSUE MASS: ICD-10-CM

## 2024-05-16 PROCEDURE — 3008F BODY MASS INDEX DOCD: CPT | Mod: CPTII,S$GLB,, | Performed by: SPECIALIST

## 2024-05-16 PROCEDURE — 99202 OFFICE O/P NEW SF 15 MIN: CPT | Mod: S$GLB,,, | Performed by: SPECIALIST

## 2024-05-16 PROCEDURE — 3078F DIAST BP <80 MM HG: CPT | Mod: CPTII,S$GLB,, | Performed by: SPECIALIST

## 2024-05-16 PROCEDURE — 99999 PR PBB SHADOW E&M-EST. PATIENT-LVL III: CPT | Mod: PBBFAC,,, | Performed by: SPECIALIST

## 2024-05-16 PROCEDURE — 1160F RVW MEDS BY RX/DR IN RCRD: CPT | Mod: CPTII,S$GLB,, | Performed by: SPECIALIST

## 2024-05-16 PROCEDURE — 3074F SYST BP LT 130 MM HG: CPT | Mod: CPTII,S$GLB,, | Performed by: SPECIALIST

## 2024-05-16 PROCEDURE — 1159F MED LIST DOCD IN RCRD: CPT | Mod: CPTII,S$GLB,, | Performed by: SPECIALIST

## 2024-06-10 ENCOUNTER — PATIENT MESSAGE (OUTPATIENT)
Dept: INTERNAL MEDICINE | Facility: CLINIC | Age: 34
End: 2024-06-10
Payer: COMMERCIAL

## 2024-07-11 ENCOUNTER — TELEPHONE (OUTPATIENT)
Dept: OTOLARYNGOLOGY | Facility: CLINIC | Age: 34
End: 2024-07-11
Payer: COMMERCIAL

## 2024-07-11 NOTE — TELEPHONE ENCOUNTER
LM on patients VM of his upcoming dates and times for his audio and ENT appointments, also put letter in mail.

## 2024-07-25 DIAGNOSIS — H91.90 HEARING DISORDER, UNSPECIFIED LATERALITY: Primary | ICD-10-CM

## (undated) DEVICE — BANDAGE ELASTIC 2X5 VELCRO ST

## (undated) DEVICE — GAUZE SPONGE 4X4 12PLY

## (undated) DEVICE — SHEET DRAPE FAN-FOLDED 3/4

## (undated) DEVICE — PAD CAST 2 IN X 4YDS STERILE

## (undated) DEVICE — SEE MEDLINE ITEM 157173

## (undated) DEVICE — TIP SUCTION YANKAUER

## (undated) DEVICE — DRESSING ADAPTIC TOUCH 3X4

## (undated) DEVICE — GLOVE BIOGEL ECLIPSE SZ 8

## (undated) DEVICE — PAD GROUNDING CONMED ADULT

## (undated) DEVICE — TOURNIQUET SB QC DP 18X4IN

## (undated) DEVICE — SEE MEDLINE ITEM 157150

## (undated) DEVICE — TRAY MINOR ORTHO

## (undated) DEVICE — CUP SPECIMEN LID